# Patient Record
Sex: FEMALE | Race: OTHER | Employment: UNEMPLOYED | URBAN - METROPOLITAN AREA
[De-identification: names, ages, dates, MRNs, and addresses within clinical notes are randomized per-mention and may not be internally consistent; named-entity substitution may affect disease eponyms.]

---

## 2022-02-21 ENCOUNTER — OFFICE VISIT (OUTPATIENT)
Dept: FAMILY MEDICINE CLINIC | Age: 22
End: 2022-02-21

## 2022-02-21 ENCOUNTER — HOSPITAL ENCOUNTER (OUTPATIENT)
Dept: LAB | Age: 22
Discharge: HOME | End: 2022-02-21

## 2022-02-21 VITALS
RESPIRATION RATE: 16 BRPM | WEIGHT: 99.4 LBS | HEIGHT: 61 IN | DIASTOLIC BLOOD PRESSURE: 65 MMHG | HEART RATE: 84 BPM | TEMPERATURE: 98.1 F | SYSTOLIC BLOOD PRESSURE: 103 MMHG | BODY MASS INDEX: 18.77 KG/M2 | OXYGEN SATURATION: 100 %

## 2022-02-21 DIAGNOSIS — R20.0 PAIN AND NUMBNESS OF LEFT UPPER EXTREMITY: ICD-10-CM

## 2022-02-21 DIAGNOSIS — R42 DIZZINESS: ICD-10-CM

## 2022-02-21 DIAGNOSIS — R00.2 PALPITATIONS: ICD-10-CM

## 2022-02-21 DIAGNOSIS — M79.602 PAIN AND NUMBNESS OF LEFT UPPER EXTREMITY: ICD-10-CM

## 2022-02-21 DIAGNOSIS — Z71.89 COUNSELING AND COORDINATION OF CARE: Primary | ICD-10-CM

## 2022-02-21 DIAGNOSIS — R55 SYNCOPE, UNSPECIFIED SYNCOPE TYPE: Primary | ICD-10-CM

## 2022-02-21 LAB — HGB BLD-MCNC: 14.2 G/DL

## 2022-02-21 PROCEDURE — 99203 OFFICE O/P NEW LOW 30 MIN: CPT | Performed by: FAMILY MEDICINE

## 2022-02-21 PROCEDURE — 85025 COMPLETE CBC W/AUTO DIFF WBC: CPT

## 2022-02-21 PROCEDURE — 84443 ASSAY THYROID STIM HORMONE: CPT

## 2022-02-21 PROCEDURE — 99080 SPECIAL REPORTS OR FORMS: CPT | Performed by: FAMILY MEDICINE

## 2022-02-21 PROCEDURE — 82306 VITAMIN D 25 HYDROXY: CPT

## 2022-02-21 PROCEDURE — 93000 ELECTROCARDIOGRAM COMPLETE: CPT | Performed by: FAMILY MEDICINE

## 2022-02-21 PROCEDURE — 85018 HEMOGLOBIN: CPT | Performed by: FAMILY MEDICINE

## 2022-02-21 PROCEDURE — 80053 COMPREHEN METABOLIC PANEL: CPT

## 2022-02-21 RX ORDER — NAPROXEN 500 MG/1
500 TABLET ORAL 2 TIMES DAILY
COMMUNITY
Start: 2022-02-16 | End: 2022-07-13

## 2022-02-21 NOTE — PROGRESS NOTES
An After Visit Summary was printed and given to the patient. Patient filled out a medical release form to have records sent from [address] [place] [address] patient in making an appointment for Cardiology. Patient met with Shira Seaman  for assistance with care card application.

## 2022-02-21 NOTE — PROGRESS NOTES
Sebastián Fofana is a 24 y.o. female  Chief Complaint   Patient presents with   [facility] Follow Up     [facility].02/15/22    Shoulder Pain     L shoulder pain    Irregular Heart Beat     dizziness. Blood pressure 103/65, pulse 84, temperature 98.1 °F (36.7 °C), temperature source Temporal, resp. rate 16, height 5' 1.02\" (1.55 m), weight 99 lb 6.4 oz (45.1 kg), last menstrual period 02/09/2022, SpO2 100 %. 1. Have you been to the ER, urgent care clinic since your last visit? Hospitalized since your last visit? No    2. Have you seen or consulted any other health care providers outside of the [address] [place] [address] since your last visit? Include any pap smears or colon screening.  No

## 2022-02-21 NOTE — PROGRESS NOTES
Assisted patient with FA application. Patient has been instructed what documents to attach to completed FA application and to bring with her to Cardiologist appointment. Patient has verbalized understanding. Patient has been screened for [facility].  Patient ha opted outl

## 2022-02-22 ENCOUNTER — OFFICE VISIT (OUTPATIENT)
Dept: CARDIOLOGY CLINIC | Age: 22
End: 2022-02-22

## 2022-02-22 VITALS
HEIGHT: 61 IN | RESPIRATION RATE: 16 BRPM | SYSTOLIC BLOOD PRESSURE: 98 MMHG | OXYGEN SATURATION: 99 % | DIASTOLIC BLOOD PRESSURE: 60 MMHG | HEART RATE: 77 BPM | WEIGHT: 99 LBS | BODY MASS INDEX: 18.69 KG/M2

## 2022-02-22 DIAGNOSIS — R94.31 ABNORMAL ECG: ICD-10-CM

## 2022-02-22 DIAGNOSIS — I10 PRIMARY HYPERTENSION: Primary | ICD-10-CM

## 2022-02-22 PROCEDURE — 99203 OFFICE O/P NEW LOW 30 MIN: CPT | Performed by: SPECIALIST

## 2022-02-22 NOTE — LETTER
2/22/2022 3:59 PM    Patient:  Sebastián Fofana   YOB: 2000  Date of Visit: 2/22/2022          To Whom it may concern:  Sergio Wu is under my care at [facility]. She is currently unable to work and this will be reasesed at her follow up appointment 3/11/22. Please call if you have any questions at [phone].         Sincerely,            Christie Granado MD

## 2022-02-22 NOTE — PATIENT INSTRUCTIONS
Please wear a 2 week loop monitor.    Please have an echo cardiogram  Follow up with Dr. Meng Kellogg after testing

## 2022-02-22 NOTE — PROGRESS NOTES
Visit Vitals  BP 98/60   Pulse 77   Resp 16   Ht 5' 1\" (1.549 m)   Wt 99 lb (44.9 kg)   LMP 02/09/2022 (Approximate)   SpO2 99%   BMI 18.71 kg/m²

## 2022-02-25 ENCOUNTER — ANCILLARY PROCEDURE (OUTPATIENT)
Dept: CARDIOLOGY CLINIC | Age: 22
End: 2022-02-25

## 2022-02-25 VITALS — BODY MASS INDEX: 18.69 KG/M2 | HEIGHT: 61 IN | WEIGHT: 99 LBS

## 2022-02-25 DIAGNOSIS — I10 PRIMARY HYPERTENSION: ICD-10-CM

## 2022-02-25 DIAGNOSIS — R94.31 ABNORMAL ECG: ICD-10-CM

## 2022-02-25 PROCEDURE — 93306 TTE W/DOPPLER COMPLETE: CPT | Performed by: SPECIALIST

## 2022-03-01 LAB
ECHO AO ROOT DIAM: 2.2 CM
ECHO AO ROOT INDEX: 1.57 CM/M2
ECHO AV AREA PEAK VELOCITY: 2.1 CM2
ECHO AV AREA VTI: 2.1 CM2
ECHO AV AREA/BSA PEAK VELOCITY: 1.5 CM2/M2
ECHO AV AREA/BSA VTI: 1.5 CM2/M2
ECHO AV MEAN GRADIENT: 3 MMHG
ECHO AV MEAN VELOCITY: 0.8 M/S
ECHO AV PEAK GRADIENT: 5 MMHG
ECHO AV PEAK VELOCITY: 1.1 M/S
ECHO AV VELOCITY RATIO: 0.82
ECHO AV VTI: 22.7 CM
ECHO LA DIAMETER INDEX: 1.64 CM/M2
ECHO LA DIAMETER: 2.3 CM
ECHO LA TO AORTIC ROOT RATIO: 1.05
ECHO LA VOL 4C: 19 ML (ref 22–52)
ECHO LA VOLUME INDEX A4C: 14 ML/M2 (ref 16–34)
ECHO LV E' LATERAL VELOCITY: 16 CM/S
ECHO LV E' SEPTAL VELOCITY: 13 CM/S
ECHO LV EDV A2C: 54 ML
ECHO LV EDV A4C: 57 ML
ECHO LV EDV BP: 57 ML (ref 56–104)
ECHO LV EDV INDEX A4C: 41 ML/M2
ECHO LV EDV INDEX BP: 41 ML/M2
ECHO LV EDV NDEX A2C: 39 ML/M2
ECHO LV EJECTION FRACTION A2C: 63 %
ECHO LV EJECTION FRACTION A4C: 63 %
ECHO LV EJECTION FRACTION BIPLANE: 63 % (ref 55–100)
ECHO LV ESV A2C: 20 ML
ECHO LV ESV A4C: 21 ML
ECHO LV ESV BP: 21 ML (ref 19–49)
ECHO LV ESV INDEX A2C: 14 ML/M2
ECHO LV ESV INDEX A4C: 15 ML/M2
ECHO LV ESV INDEX BP: 15 ML/M2
ECHO LV FRACTIONAL SHORTENING: 35 % (ref 28–44)
ECHO LV INTERNAL DIMENSION DIASTOLE INDEX: 2.64 CM/M2
ECHO LV INTERNAL DIMENSION DIASTOLIC: 3.7 CM (ref 3.9–5.3)
ECHO LV INTERNAL DIMENSION SYSTOLIC INDEX: 1.71 CM/M2
ECHO LV INTERNAL DIMENSION SYSTOLIC: 2.4 CM
ECHO LV IVSD: 0.8 CM (ref 0.6–0.9)
ECHO LV MASS 2D: 82.3 G (ref 67–162)
ECHO LV MASS INDEX 2D: 58.8 G/M2 (ref 43–95)
ECHO LV POSTERIOR WALL DIASTOLIC: 0.8 CM (ref 0.6–0.9)
ECHO LV RELATIVE WALL THICKNESS RATIO: 0.43
ECHO LVOT AREA: 2.5 CM2
ECHO LVOT AV VTI INDEX: 0.81
ECHO LVOT DIAM: 1.8 CM
ECHO LVOT MEAN GRADIENT: 2 MMHG
ECHO LVOT PEAK GRADIENT: 3 MMHG
ECHO LVOT PEAK VELOCITY: 0.9 M/S
ECHO LVOT STROKE VOLUME INDEX: 33.4 ML/M2
ECHO LVOT SV: 46.8 ML
ECHO LVOT VTI: 18.4 CM
ECHO MV A VELOCITY: 0.61 M/S
ECHO MV E DECELERATION TIME (DT): 121.5 MS
ECHO MV E VELOCITY: 0.86 M/S
ECHO MV E/A RATIO: 1.41
ECHO MV E/E' LATERAL: 5.38
ECHO MV E/E' RATIO (AVERAGED): 6
ECHO MV E/E' SEPTAL: 6.62
ECHO PV MAX VELOCITY: 0.9 M/S
ECHO PV PEAK GRADIENT: 3 MMHG
ECHO RV TAPSE: 1.8 CM (ref 1.5–2)
ECHO TV REGURGITANT MAX VELOCITY: 2.23 M/S
ECHO TV REGURGITANT PEAK GRADIENT: 20 MMHG

## 2022-03-11 ENCOUNTER — TELEPHONE (OUTPATIENT)
Dept: CARDIOLOGY CLINIC | Age: 22
End: 2022-03-11

## 2022-03-11 ENCOUNTER — OFFICE VISIT (OUTPATIENT)
Dept: CARDIOLOGY CLINIC | Age: 22
End: 2022-03-11

## 2022-03-11 VITALS
DIASTOLIC BLOOD PRESSURE: 60 MMHG | HEIGHT: 61 IN | WEIGHT: 101 LBS | RESPIRATION RATE: 16 BRPM | HEART RATE: 67 BPM | SYSTOLIC BLOOD PRESSURE: 88 MMHG | OXYGEN SATURATION: 99 % | BODY MASS INDEX: 19.07 KG/M2

## 2022-03-11 DIAGNOSIS — I95.1 ORTHOSTATIC HYPOTENSION: Primary | ICD-10-CM

## 2022-03-11 DIAGNOSIS — I95.1 ORTHOSTATIC HYPOTENSION: ICD-10-CM

## 2022-03-11 PROBLEM — I95.9 HYPOTENSION: Status: ACTIVE | Noted: 2022-03-11

## 2022-03-11 PROCEDURE — 99214 OFFICE O/P EST MOD 30 MIN: CPT | Performed by: SPECIALIST

## 2022-03-11 RX ORDER — FLUDROCORTISONE ACETATE 0.1 MG/1
0.1 TABLET ORAL DAILY
Qty: 90 TABLET | Refills: 0 | Status: SHIPPED | OUTPATIENT
Start: 2022-03-11 | End: 2022-07-13

## 2022-03-11 NOTE — TELEPHONE ENCOUNTER
Kymberly Giles is calling from [facility] at [facility] and she is trying to find out if the patient needs a tilt table test,TE or Cardioversion to be performed.     [phone]

## 2022-03-11 NOTE — PROGRESS NOTES
Visit Vitals  BP (!) 88/60   Pulse 67   Resp 16   Ht 5' 1\" (1.549 m)   Wt 101 lb (45.8 kg)   LMP 02/09/2022 (Approximate)   SpO2 99%   BMI 19.08 kg/m²

## 2022-03-11 NOTE — H&P (VIEW-ONLY)
[facility]                                                            OFFICE NOTE        Deanne Spatz M.D.,MIYA. Ramón Torres   2000  [id number]    Date/Time:  3/11/985506:57 AM            SUBJECTIVE:  She continues to have dizziness and palpitations. She did have palpitation during her loop monitor. Syncope not occurred yet but she has had several episode of presyncope she tells me. Assessment/Plan    1. Palpitationsdizziness:    I discussed with her the results of the 2 weeks event monitor and the echocardiogram.    The event monitor despite the occurrences of palpitations of failed to reveal any complex dysrhythmias and revealed only sinus tachycardia. The echocardiogram the other hand was essentially normal with a normal ejection fraction only mild tricuspid regurgitation. At this point it does not appear to be a complex rhythm issues with this patient but we need to consider the possibility of autonomic dysfunctionPOTS. Start Florinef 0.1 mg daily. I have asked her to increase fluid intake and salt intake. Proceed with tilt table test.        HPI   Very pleasant 24years old female with unremarkable past medical history who presents for cardiac evaluation of the palpitations. Apparently the patient was seen at [facility] for palpitations and she was told she had an arrhythmia but they were not sure what it was released she does not know what it was.     She does have dizziness discomfort in the left arm when she does have the palpitations. There is no report of syncopal episode at this point. There is no history of hypertension hyperlipidemia diabetes congenital heart disease that she knows of.     She does not drink or smoke. Currently not working or studying.               CARDIAC STUDIES        02/25/22    ECHO ADULT COMPLETE 03/01/2022 3/1/2022    Interpretation Summary    Left Ventricle: Left ventricle size is normal. Normal wall thickness. Normal wall motion. Normal left ventricular systolic function. EF by 2D Simpsons Biplane is 63%. Normal diastolic function.   Tricuspid Valve: Valve structure is normal. Trace transvalvular regurgitation. Signed by: Fadumo Lafleur MD on 3/1/2022  4:33 PM                              EKG Results     None              IMAGING      MRI Results (most recent):  No results found for this or any previous visit. CT Results (most recent):  No results found for this or any previous visit. XR Results (most recent):  No results found for this or any previous visit. No past medical history on file. No past surgical history on file. Social History     Tobacco Use    Smoking status: Never Smoker    Smokeless tobacco: Never Used   Vaping Use    Vaping Use: Never used   Substance Use Topics    Alcohol use: Never    Drug use: Never     No family history on file. No Known Allergies      Visit Vitals  BP (!) 88/60   Pulse 67   Resp 16   Ht 5' 1\" (1.549 m)   Wt 101 lb (45.8 kg)   LMP 02/09/2022 (Approximate)   SpO2 99%   BMI 19.08 kg/m²         Last 3 Recorded Weights in this Encounter    03/11/22 1044   Weight: 101 lb (45.8 kg)            Review of Systems:   Pertinent items are noted in the History of Present Illness. Visit Vitals  BP (!) 88/60   Pulse 67   Resp 16   Ht 5' 1\" (1.549 m)   Wt 101 lb (45.8 kg)   LMP 02/09/2022 (Approximate)   SpO2 99%   BMI 19.08 kg/m²     General Appearance:  Well developed, well nourished,alert and oriented x 3, and individual in no acute distress. Ears/Nose/Mouth/Throat:   Hearing grossly normal.         Neck: Supple. Chest:   Lungs clear to auscultation bilaterally. Cardiovascular:  Regular rate and rhythm, S1, S2 normal, no murmur. Abdomen:   Soft, non-tender, bowel sounds are active. Extremities: No edema bilaterally.     Skin: Warm and dry. Current Outpatient Medications on File Prior to Visit   Medication Sig Dispense Refill    naproxen (NAPROSYN) 500 mg tablet Take 500 mg by mouth two (2) times a day. No current facility-administered medications on file prior to visit. Estefany Bonilla had no medications administered during this visit. Current Outpatient Medications   Medication Sig    naproxen (NAPROSYN) 500 mg tablet Take 500 mg by mouth two (2) times a day. No current facility-administered medications for this visit. No results found for: CHOL, CHOLPOCT, CHOLX, CHLST, CHOLV, HDL, HDLPOC, HDLP, LDL, LDLCPOC, LDLC, DLDLP, VLDLC, VLDL, TGLX, TRIGL, TRIGP, TGLPOCT, CHHD, CHHDX    No results found for: NA, K, CL, CO2, AGAP, GLU, BUN, CREA, BUCR, GFRAA, GFRNA, CA, GFRAA    No results found for: ALT, GGT, GGTP, AP, APIT, APX, CBIL, TBIL, TBILI    Lab Results   Component Value Date/Time    Hemoglobin (POC) 14.2 02/21/2022 11:44 AM       No results found for: TSH, TSH2, TSH3, TSHP, TSHEXT      No results found for: CHOL, CHOLPOCT, CHOLX, CHLST, CHOLV, HDL, HDLP, LDL, LDLC, DLDLP, TGLX, TRIGL, TRIGP, TGLPOCT, NTGLT, CHHD, CHHDX             Please note that this dictation was completed with Fision, the "[employer], Mobile [employer]" voice recognition software. Quite often unanticipated grammatical, syntax, homophones, and other interpretative errors are inadvertently transcribed by the computer software. Please disregard these errors. Please excuse any errors that have escaped final proofreading.

## 2022-03-11 NOTE — PROGRESS NOTES
[address] [facility]                                                            OFFICE NOTE        Radu Florence M.D.,MIYA. Ortiz Santizo   2000  [id number]    Date/Time:  3/11/910196:57 AM            SUBJECTIVE:  She continues to have dizziness and palpitations. She did have palpitation during her loop monitor. Syncope not occurred yet but she has had several episode of presyncope she tells me. Assessment/Plan    1. Palpitations-dizziness:    I discussed with her the results of the 2 weeks event monitor and the echocardiogram.    The event monitor despite the occurrences of palpitations of failed to reveal any complex dysrhythmias and revealed only sinus tachycardia. The echocardiogram the other hand was essentially normal with a normal ejection fraction only mild tricuspid regurgitation. At this point it does not appear to be a complex rhythm issues with this patient but we need to consider the possibility of autonomic dysfunction-POTS. Start Florinef 0.1 mg daily. I have asked her to increase fluid intake and salt intake. Proceed with tilt table test.        HPI   Very pleasant 24years old female with unremarkable past medical history who presents for cardiac evaluation of the palpitations. Apparently the patient was seen at [facility] for palpitations and she was told she had an arrhythmia but they were not sure what it was released she does not know what it was.     She does have dizziness discomfort in the left arm when she does have the palpitations. There is no report of syncopal episode at this point. There is no history of hypertension hyperlipidemia diabetes congenital heart disease that she knows of.     She does not drink or smoke. Currently not working or studying.               CARDIAC STUDIES        02/25/22    ECHO ADULT COMPLETE 03/01/2022 3/1/2022    Interpretation Summary    Left Ventricle: Left ventricle size is normal. Normal wall thickness. Normal wall motion. Normal left ventricular systolic function. EF by 2D Simpsons Biplane is 63%. Normal diastolic function.   Tricuspid Valve: Valve structure is normal. Trace transvalvular regurgitation. Signed by: Jagdish Conner MD on 3/1/2022  4:33 PM                              EKG Results     None              IMAGING      MRI Results (most recent):  No results found for this or any previous visit. CT Results (most recent):  No results found for this or any previous visit. XR Results (most recent):  No results found for this or any previous visit. No past medical history on file. No past surgical history on file. Social History     Tobacco Use    Smoking status: Never Smoker    Smokeless tobacco: Never Used   Vaping Use    Vaping Use: Never used   Substance Use Topics    Alcohol use: Never    Drug use: Never     No family history on file. No Known Allergies      Visit Vitals  BP (!) 88/60   Pulse 67   Resp 16   Ht 5' 1\" (1.549 m)   Wt 101 lb (45.8 kg)   LMP 02/09/2022 (Approximate)   SpO2 99%   BMI 19.08 kg/m²         Last 3 Recorded Weights in this Encounter    03/11/22 1044   Weight: 101 lb (45.8 kg)            Review of Systems:   Pertinent items are noted in the History of Present Illness. Visit Vitals  BP (!) 88/60   Pulse 67   Resp 16   Ht 5' 1\" (1.549 m)   Wt 101 lb (45.8 kg)   LMP 02/09/2022 (Approximate)   SpO2 99%   BMI 19.08 kg/m²     General Appearance:  Well developed, well nourished,alert and oriented x 3, and individual in no acute distress. Ears/Nose/Mouth/Throat:   Hearing grossly normal.         Neck: Supple. Chest:   Lungs clear to auscultation bilaterally. Cardiovascular:  Regular rate and rhythm, S1, S2 normal, no murmur. Abdomen:   Soft, non-tender, bowel sounds are active. Extremities: No edema bilaterally.     Skin: Warm and dry. Current Outpatient Medications on File Prior to Visit   Medication Sig Dispense Refill    naproxen (NAPROSYN) 500 mg tablet Take 500 mg by mouth two (2) times a day. No current facility-administered medications on file prior to visit. Estefany Busch had no medications administered during this visit. Current Outpatient Medications   Medication Sig    naproxen (NAPROSYN) 500 mg tablet Take 500 mg by mouth two (2) times a day. No current facility-administered medications for this visit. No results found for: CHOL, CHOLPOCT, CHOLX, CHLST, CHOLV, HDL, HDLPOC, HDLP, LDL, LDLCPOC, LDLC, DLDLP, VLDLC, VLDL, TGLX, TRIGL, TRIGP, TGLPOCT, CHHD, CHHDX    No results found for: NA, K, CL, CO2, AGAP, GLU, BUN, CREA, BUCR, GFRAA, GFRNA, CA, GFRAA    No results found for: ALT, GGT, GGTP, AP, APIT, APX, CBIL, TBIL, TBILI    Lab Results   Component Value Date/Time    Hemoglobin (POC) 14.2 02/21/2022 11:44 AM       No results found for: TSH, TSH2, TSH3, TSHP, TSHEXT      No results found for: CHOL, CHOLPOCT, CHOLX, CHLST, CHOLV, HDL, HDLP, LDL, LDLC, DLDLP, TGLX, TRIGL, TRIGP, TGLPOCT, NTGLT, CHHD, CHHDX             Please note that this dictation was completed with Ecowell, the K[employer] voice recognition software. Quite often unanticipated grammatical, syntax, homophones, and other interpretative errors are inadvertently transcribed by the computer software. Please disregard these errors. Please excuse any errors that have escaped final proofreading.

## 2022-03-11 NOTE — PATIENT INSTRUCTIONS
Please START Flornief 0.1mg daily    Your Tilt Table Test procedure has been scheduled for 3/28/22 at 12:30 pm, at [facility].    Please report to Admitting Department by 11:00 am, or 2 hours prior to your scheduled procedure. Please bring a list of your current medications and medication bottles, if able, to the hospital on this day. You will need labs drawn prior to your procedure. Please go to have this done today. You should not stop your medication prior to your scheduled procedure. COVID testing 3-4 day prior to procedure. [facility]:  [facility] location: Patient discharge area at the corner of [address] [place] [address] 200: [id number] [facility] [address]  Hours: Monday-Friday 7 am to 3 pm       Prueba de la madrid inclinada: Sobre esta prueba  Tilt Table Test: About This Test  ¿Qué es? La prueba de la madrid inclinada se [place] para revisar a personas que se dimas desmayado o que se sienten aturdidas con frecuencia. Los [employer] a gregorio médico a conocer la causa de gregorio desmayo o de gregorio sensación de aturdimiento. La prueba [place] zoey madrid especial que lo inclina lentamente a zoey posición erguida. Revisa cómo responde gregorio cuerpo cuando usted cambia de posición. ¿Por qué se hace esta prueba? Esta prueba se hace para averiguar por qué usted podría tener ciertos síntomas, ailyn desmayarse o sentirse aturdido. Gregorio médico puede ca si roc síntomas están causados por problemas con gregorio frecuencia cardíaca o presión arterial.  ¿Cómo puede prepararse para la prueba? · Arnold vez se le pida que no coma ni kenyon por algunas horas antes de la prueba. · Arnold vez necesite que alguien lo conduzca a gregorio casa después de la prueba. · Informe a gregorio médico sobre [employer], vitaminas, suplementos y edgardo herbarios que florencia. Algunos pueden aumentar el riesgo de problemas priscilla gregorio prueba. Gregorio médico le dirá si debería dejar de lily cualquiera de ellos antes de la prueba y cuándo debe hacerlo.   ¿Cómo se hace la prueba? · La prueba suele hacerse en un hospital o en el consultorio de un cardiólogo. · Le pondrán pequeños parches o discos que se adhieren a la piel. Estos son sensores que registran lo que hace el corazón. También tendrá Christiana Colton manga para lily la presión arterial en el brazo. Y tony vez tenga zoey vía intravenosa. · Priscilla la prueba, usted estará acostado en zoey madrid que lo inclinará a zoey posición hussein vertical (de pie). Usted estará amarrado firmemente a la madrid. · Le revisarán la frecuencia cardíaca y la presión arterial regularmente a medida que la madrid se inclina. · Le preguntarán si siente algún síntoma ailyn náuseas, sudoración, mareo o un latido cardíaco anormal. Si no tiene ningún síntoma, es posible que le den medicamentos para acelerarle la frecuencia cardíaca. Entonces lo volverán a revisar para ca si tiene síntomas. · Si se desmaya priscilla la prueba, se volverá a poner la madrid en posición horizontal. Lo revisarán detenidamente y el equipo médico lo atenderá de inmediato. [address] personas se despierta inmediatamente. · Le revisarán la frecuencia cardíaca y la presión arterial antes de que regrese a gregorio casa. ¿Qué significan los resultados de la prueba? El resultado de la prueba es normal si gregorio presión arterial se mantiene estable priscilla la prueba y usted no siente aturdimiento ni se desmaya. El resultado de la prueba no es normal si  gregorio presión arterial y se siente aturdido o se desmaya. Estos síntomas podrían ocurrir debido a zoey frecuencia cardíaca lenta. ¿Cuánto tiempo dura la prueba? La prueba dura alrededor de Froilan Norris. Puede durar más tiempo si le dan un medicamento para acelerarle el corazón priscilla la prueba. ¿Qué ocurre después de la prueba? Probablemente pueda volver a roc actividades habituales inmediatamente. [employer] se sienten un poco cansadas o con náuseas. ¿Dónde puede encontrar más información en inglés?   Ray Porter a http://www.gray.com/  Escriba Q013 en la búsqueda para aprender más acerca de \"Prueba de la madrid inclinada: Sobre esta prueba. \"  Revisado: 10 enero, 2022               Versión del contenido: 13.2  © 2006-2022 Healthwise, [employer]. Las instrucciones de cuidado fueron adaptadas bajo licencia por [employer] [facility] [employer] (which disclaims liability or warranty for this information). Si usted tiene [place] afección médica o sobre estas instrucciones, siempre pregunte a gregorio profesional de lyndsey. [employer], [employer] niega toda garantía o responsabilidad por gregorio uso de esta información.

## 2022-03-18 PROBLEM — I95.9 HYPOTENSION: Status: ACTIVE | Noted: 2022-03-11

## 2022-03-22 ENCOUNTER — TELEPHONE (OUTPATIENT)
Dept: FAMILY MEDICINE CLINIC | Age: 22
End: 2022-03-22

## 2022-03-22 NOTE — TELEPHONE ENCOUNTER
Tc from the pt returning a call she got from the front office. The pt asked about an appt tomorrow that she had been called about she did not know what it was for or where it is located. The pt was given the address and phone # it was also texted to her and she was told it was for a lab only appt. The pt was also asked about the over due chest xray. If she was still going to get it or could we cancel the order. The pt did stated she did not know about the order. Explained procedure for obtaining xray as a walk-in and told pt to go to Outpatient Registration. Provided address and directions to facility via texted the picture of the imaging centers with the addresses. Told pt that someone will call them after we get results. She verbalized understanding.  Vu Zuniga RN

## 2022-03-22 NOTE — TELEPHONE ENCOUNTER
Tc to the pt with Oscar Almanzar  2x. No answer. A message was left for her to call the cbn. The pt was being called to ask about the over due chest xray. We needed to know if she would still need this or can we cancel it.  Cassie Lara RN

## 2022-03-23 ENCOUNTER — LAB ONLY (OUTPATIENT)
Dept: FAMILY MEDICINE CLINIC | Age: 22
End: 2022-03-23

## 2022-03-23 ENCOUNTER — HOSPITAL ENCOUNTER (OUTPATIENT)
Dept: GENERAL RADIOLOGY | Age: 22
Discharge: HOME | End: 2022-03-23
Attending: FAMILY MEDICINE
Payer: MEDICAID

## 2022-03-23 ENCOUNTER — HOSPITAL ENCOUNTER (OUTPATIENT)
Dept: LAB | Age: 22
Discharge: HOME | End: 2022-03-23

## 2022-03-23 DIAGNOSIS — R55 SYNCOPE, UNSPECIFIED SYNCOPE TYPE: ICD-10-CM

## 2022-03-23 DIAGNOSIS — R42 DIZZINESS: ICD-10-CM

## 2022-03-23 DIAGNOSIS — R55 SYNCOPE, UNSPECIFIED SYNCOPE TYPE: Primary | ICD-10-CM

## 2022-03-23 PROCEDURE — 71046 X-RAY EXAM CHEST 2 VIEWS: CPT

## 2022-03-23 RX ORDER — SODIUM CHLORIDE 0.9 % (FLUSH) 0.9 %
5-40 SYRINGE (ML) INJECTION AS NEEDED
Status: CANCELLED | OUTPATIENT
Start: 2022-03-23

## 2022-03-23 RX ORDER — SODIUM CHLORIDE 0.9 % (FLUSH) 0.9 %
5-40 SYRINGE (ML) INJECTION EVERY 8 HOURS
Status: CANCELLED | OUTPATIENT
Start: 2022-03-23

## 2022-03-24 ENCOUNTER — HOSPITAL ENCOUNTER (OUTPATIENT)
Dept: PREADMISSION TESTING | Age: 22
Discharge: HOME | End: 2022-03-24
Attending: INTERNAL MEDICINE
Payer: MEDICAID

## 2022-03-24 ENCOUNTER — TELEPHONE (OUTPATIENT)
Dept: CARDIOLOGY CLINIC | Age: 22
End: 2022-03-24

## 2022-03-24 ENCOUNTER — TRANSCRIBE ORDER (OUTPATIENT)
Dept: REGISTRATION | Age: 22
End: 2022-03-24

## 2022-03-24 DIAGNOSIS — U07.1 COVID-19: Primary | ICD-10-CM

## 2022-03-24 DIAGNOSIS — U07.1 COVID-19: ICD-10-CM

## 2022-03-24 PROCEDURE — 80053 COMPREHEN METABOLIC PANEL: CPT

## 2022-03-24 PROCEDURE — 84443 ASSAY THYROID STIM HORMONE: CPT

## 2022-03-24 PROCEDURE — U0005 INFEC AGEN DETEC AMPLI PROBE: HCPCS

## 2022-03-24 PROCEDURE — 82306 VITAMIN D 25 HYDROXY: CPT

## 2022-03-24 PROCEDURE — 85025 COMPLETE CBC W/AUTO DIFF WBC: CPT

## 2022-03-24 NOTE — TELEPHONE ENCOUNTER
----- Message from Manny Brunner RN sent at 3/14/2022  8:55 AM EDT -----  Regarding: FW: Tilt Table Test    ----- Message -----  From: Chelsie Chaparro MD  Sent: 3/13/2022   4:10 PM EDT  To: Manny Brunner RN, Tiffani Palmer RN, #  Subject: RE: Tilt Table Test                              We need to hold it 2 days before. Tilt test is not recommended to evaluate effectiveness of medication  ----- Message -----  From: Ken Medrnao RN  Sent: 3/11/2022  11:30 AM EDT  To: Toshia Austin MD  Subject: Tilt Table Test                                  Hey this is a patient of Dr. Gio Elizabeth that I am setting up for a Tilt. [facility] and he wanted me to verify with you that she does not need to hold prior to her test.     I know we normally do not hold but I told him I would verify with you    Thanks!

## 2022-03-24 NOTE — TELEPHONE ENCOUNTER
TC to pt, ID verified.  ID # [id number]. Advised pt to hold florinef 2 days prior to procedure. Pt understands. Pt requested I speak with her mother. Her mother states the pt got a letter saying she would owe over 1k dollars for her procedure. She was under the impression she had filled for something that would make it free. States she is not sure who called her yesterday or what number. Advised her to call the billing department. Reiterated instructions and confirmed Tilt table test date time and location. No further questions.

## 2022-03-28 ENCOUNTER — HOSPITAL ENCOUNTER (OUTPATIENT)
Dept: NON INVASIVE DIAGNOSTICS | Age: 22
Discharge: HOME | End: 2022-03-28
Attending: INTERNAL MEDICINE
Payer: MEDICAID

## 2022-03-28 VITALS
DIASTOLIC BLOOD PRESSURE: 54 MMHG | RESPIRATION RATE: 21 BRPM | SYSTOLIC BLOOD PRESSURE: 105 MMHG | OXYGEN SATURATION: 97 % | HEART RATE: 86 BPM

## 2022-03-28 DIAGNOSIS — Z00.8 TILT TABLE EVALUATION: ICD-10-CM

## 2022-03-28 DIAGNOSIS — I95.9 ACUTE HYPOTENSION: ICD-10-CM

## 2022-03-28 PROCEDURE — 74011250637 HC RX REV CODE- 250/637: Performed by: INTERNAL MEDICINE

## 2022-03-28 PROCEDURE — 93660 TILT TABLE EVALUATION: CPT | Performed by: INTERNAL MEDICINE

## 2022-03-28 PROCEDURE — 93660 TILT TABLE EVALUATION: CPT

## 2022-03-28 RX ORDER — NITROGLYCERIN 0.4 MG/1
0.4 TABLET SUBLINGUAL
Status: DISCONTINUED | OUTPATIENT
Start: 2022-03-28 | End: 2022-04-01 | Stop reason: HOSPADM

## 2022-03-28 RX ORDER — SODIUM CHLORIDE 0.9 % (FLUSH) 0.9 %
5-40 SYRINGE (ML) INJECTION EVERY 8 HOURS
Status: CANCELLED | OUTPATIENT
Start: 2022-03-28

## 2022-03-28 RX ORDER — SODIUM CHLORIDE 0.9 % (FLUSH) 0.9 %
5-40 SYRINGE (ML) INJECTION AS NEEDED
Status: CANCELLED | OUTPATIENT
Start: 2022-03-28

## 2022-03-28 RX ORDER — HYDROCODONE BITARTRATE AND ACETAMINOPHEN 5; 325 MG/1; MG/1
1 TABLET ORAL
Status: CANCELLED | OUTPATIENT
Start: 2022-03-28

## 2022-03-28 RX ORDER — ACETAMINOPHEN 325 MG/1
650 TABLET ORAL
Status: CANCELLED | OUTPATIENT
Start: 2022-03-28

## 2022-03-28 RX ORDER — ONDANSETRON 2 MG/ML
4 INJECTION INTRAMUSCULAR; INTRAVENOUS
Status: CANCELLED | OUTPATIENT
Start: 2022-03-28

## 2022-03-28 RX ADMIN — NITROGLYCERIN 0.4 MG: 0.4 TABLET, ORALLY DISINTEGRATING SUBLINGUAL at 13:11

## 2022-03-28 NOTE — PROGRESS NOTES
Post tilt table d/c instructions reviewed with pt. And mother using the  services. Pt. Instructed to resume Florinef, eat high salt foods, and increase fluid intake especially in the hot weather. Informed that in the event of pregnancy, she should call Dr. Melba Carvalho office and will need to d/c the Florinef. Verbalized understanding but written instructions given in Bulgarian.

## 2022-03-28 NOTE — PROCEDURES
Cardiac Procedure Note   Patient: Karen Chang  MRN: [id number]  SSN: [id number]   YOB: 2000 Age: 24 y.o.   Sex: female    Date of Procedure: 3/28/2022   Pre-procedure Diagnosis: dizziness, sinus tachycardia  Post-procedure Diagnosis: orthostatic hypotension with reflex tachycardia  Procedure: Tilt Table Study  :  Dr. Nico Alexander MD    Assistant(s):  None  Anesthesia: none  Estimated Blood Loss: none  Specimens Removed: None  Findings: 1 sublingual nitro  Orthostatic hypotension   Mild dizziness  Complications: None   Implants:  None  Signed by:  Nico Alexander MD  3/28/2022  3:33 PM
Detail Level: Detailed
Quality 111:Pneumonia Vaccination Status For Older Adults: Pneumococcal Vaccination not Administered or Previously Received, Reason not Otherwise Specified
Quality 431: Preventive Care And Screening: Unhealthy Alcohol Use - Screening: Patient screened for unhealthy alcohol use using a single question and scores less than 2 times per year
Quality 110: Preventive Care And Screening: Influenza Immunization: Influenza Immunization Administered during Influenza season
Quality 226: Preventive Care And Screening: Tobacco Use: Screening And Cessation Intervention: Patient screened for tobacco use and is an ex/non-smoker

## 2022-03-28 NOTE — DISCHARGE INSTRUCTIONS
Discharge Instructions Post Tilt Table Test    1. No driving restrictions related to today's procedure. No sedation medication was given. 2.  Follow up as noted below. Future Appointments   Date Time Provider Darryl Estrada   3/28/2022 12:30 PM STRESS ECHO LAB 1 [facility] [address]   4/13/2022 11:00 AM MD Samanta Cuello M.D.   Electrophysiology/Cardiology  [facility] [place]  [address] [place] [address]                               [phone]

## 2022-03-28 NOTE — INTERVAL H&P NOTE
Update History & Physical    The Patient's History and Physical of March 11, 2022 was reviewed with the patient and I examined the patient. There was no change. The surgical site was confirmed by the patient and me. Plan:  The risk, benefits, expected outcome, and alternative to the recommended procedure have been discussed with the patient. Patient understands and wants to proceed with the procedure.     Electronically signed by Ivelisse White MD on 3/28/2022 at 3:36 PM

## 2022-03-30 ENCOUNTER — TELEPHONE (OUTPATIENT)
Dept: CARDIOLOGY CLINIC | Age: 22
End: 2022-03-30

## 2022-03-30 NOTE — TELEPHONE ENCOUNTER
Per VO from Dr. Acosta Steinberg pt okay to return to work as long as she is feeling better with medication and she will need to keep her follow up. Spoke with pt and family member. Pt states she has been doing well since starting Florinef and has not had any dizzy episodes since. Explained to pt she will need to continue this medication and to keep her follow up. Letter given to pt to return to work.

## 2022-03-30 NOTE — LETTER
NOTIFICATION OF RETURN TO WORK / SCHOOL    3/30/2022 1:41 PM    Ms. Kenzie Hernández  Libby Rodriguez - [facility] [address] [place] [id number]  . To Whom It May Concern:    Kenzie Hernández is under the care of [address] . She will be able to return to work/school on 3/31/22. If there are questions or concerns please have the patient contact our office.     Sincerely,            Romain Molina MD

## 2022-03-30 NOTE — TELEPHONE ENCOUNTER
Pt walked into office requesting letter stating she can go back to work. Advised I would forward her message to Dr. Teresa Kruse and get back with her.

## 2022-04-04 ENCOUNTER — TELEPHONE (OUTPATIENT)
Dept: FAMILY MEDICINE CLINIC | Age: 22
End: 2022-04-04

## 2022-04-04 NOTE — TELEPHONE ENCOUNTER
Tc to [facility] spoke with Denice. She stated they have all the pt's labs that were done on 03/23/22, and 03/24/22. They were drawn at [facility] lab. Denice will fax the labs over to [phone] now.  Jyotsna May RN

## 2022-04-04 NOTE — TELEPHONE ENCOUNTER
Returned my call from earlier and left a message that her chest xray was normal. She asked for her lab work results. There was none in the chart for her.  Lester Colindres RN

## 2022-04-04 NOTE — PROGRESS NOTES
Tc to the pt with the message from the provider regarding her chest xray. Anthony Gong was the . The pt was called 2x. A message was left with the results of the chest xray (it was normal) on her identifiable VM.  Malon Alpers, RN

## 2022-04-07 ENCOUNTER — TELEPHONE (OUTPATIENT)
Dept: FAMILY MEDICINE CLINIC | Age: 22
End: 2022-04-07

## 2022-04-07 NOTE — TELEPHONE ENCOUNTER
The copy of the pt's missing lab results from 03/23/22,, sent to the [facility] main office. They are not in the pt's chart in [facility]. The copy was made to give to Dr Lynne Cee and a copy was put in the scanning basket for Bonney Severe to scan into the pt's chart. A message was routed to the provider regarding these lab results. The pt is waiting for a call with her lab results.  Mishel Gay RN

## 2022-04-08 LAB
ALBUMIN SERPL-MCNC: 4 G/DL (ref 3.5–5)
ALBUMIN/GLOB SERPL: 1.2 {RATIO} (ref 1.1–2.2)
ALP SERPL-CCNC: 99 U/L (ref 45–117)
ALT SERPL-CCNC: 22 U/L (ref 12–78)
ANION GAP SERPL CALC-SCNC: 6 MMOL/L (ref 5–15)
AST SERPL-CCNC: 10 U/L (ref 15–37)
BASOPHILS # BLD: 0 K/UL (ref 0–0.1)
BASOPHILS NFR BLD: 1 % (ref 0–1)
BILIRUB SERPL-MCNC: 0.4 MG/DL (ref 0.2–1)
BUN SERPL-MCNC: 13 MG/DL (ref 6–20)
BUN/CREAT SERPL: 21 (ref 12–20)
CALCIUM SERPL-MCNC: 9.2 MG/DL (ref 8.5–10.1)
CHLORIDE SERPL-SCNC: 105 MMOL/L (ref 97–108)
CO2 SERPL-SCNC: 27 MMOL/L (ref 21–32)
COMMENT, HOLDF: NORMAL
CREAT SERPL-MCNC: 0.61 MG/DL (ref 0.55–1.02)
DIFFERENTIAL METHOD BLD: ABNORMAL
EOSINOPHIL # BLD: 0.1 K/UL (ref 0–0.4)
EOSINOPHIL NFR BLD: 2 % (ref 0–7)
ERYTHROCYTE [DISTWIDTH] IN BLOOD BY AUTOMATED COUNT: 13.2 % (ref 11.5–14.5)
GLOBULIN SER CALC-MCNC: 3.4 G/DL (ref 2–4)
GLUCOSE SERPL-MCNC: 93 MG/DL (ref 65–100)
HCT VFR BLD AUTO: 39.5 % (ref 35–47)
HGB BLD-MCNC: 12.9 G/DL (ref 11.5–16)
IMM GRANULOCYTES # BLD AUTO: 0 K/UL (ref 0–0.04)
IMM GRANULOCYTES NFR BLD AUTO: 1 % (ref 0–0.5)
LYMPHOCYTES # BLD: 2.2 K/UL (ref 0.8–3.5)
LYMPHOCYTES NFR BLD: 35 % (ref 12–49)
MCH RBC QN AUTO: 29.1 PG (ref 26–34)
MCHC RBC AUTO-ENTMCNC: 32.7 G/DL (ref 30–36.5)
MCV RBC AUTO: 89.2 FL (ref 80–99)
MONOCYTES # BLD: 0.5 K/UL (ref 0–1)
MONOCYTES NFR BLD: 8 % (ref 5–13)
NEUTS SEG # BLD: 3.4 K/UL (ref 1.8–8)
NEUTS SEG NFR BLD: 53 % (ref 32–75)
NRBC # BLD: 0 K/UL (ref 0–0.01)
NRBC BLD-RTO: 0 PER 100 WBC
PLATELET # BLD AUTO: 267 K/UL (ref 150–400)
PMV BLD AUTO: 10.3 FL (ref 8.9–12.9)
POTASSIUM SERPL-SCNC: 4.1 MMOL/L (ref 3.5–5.1)
PROT SERPL-MCNC: 7.4 G/DL (ref 6.4–8.2)
RBC # BLD AUTO: 4.43 M/UL (ref 3.8–5.2)
SAMPLES BEING HELD,HOLD: NORMAL
SARS-COV-2, XPLCVT: NOT DETECTED
SODIUM SERPL-SCNC: 138 MMOL/L (ref 136–145)
SOURCE, COVRS: NORMAL
WBC # BLD AUTO: 6.3 K/UL (ref 3.6–11)

## 2022-04-09 NOTE — PROGRESS NOTES
My apologies for the delay in her results. She has a vitamin D deficiency. I do recommend taking some OTC Vitamin D3 supplements 1000IU daily. Otherwise, her labs are normal -- she is not anemic or diabetic. Her liver, kidney and thyroid are functioning normally and her electrolytes are normal  Thanks.

## 2022-04-11 NOTE — PROGRESS NOTES
Tc to the pt 2x. No answer. The phone goes straight into an automated message stating the person you are calling is unavailable. No opportunity to leave a message. Tc to the pt's emergency contact (mother). The pt's mother Sol Vallecillo is on the pt's PHI. No one answered. A message was left for her to contact the [facility]. A lab letter was created and sent to the queue.  Danny Baez RN

## 2022-04-13 ENCOUNTER — OFFICE VISIT (OUTPATIENT)
Dept: CARDIOLOGY CLINIC | Age: 22
End: 2022-04-13
Payer: MEDICAID

## 2022-04-13 VITALS
WEIGHT: 103 LBS | BODY MASS INDEX: 19.45 KG/M2 | HEIGHT: 61 IN | SYSTOLIC BLOOD PRESSURE: 94 MMHG | DIASTOLIC BLOOD PRESSURE: 70 MMHG | RESPIRATION RATE: 16 BRPM | OXYGEN SATURATION: 99 % | HEART RATE: 91 BPM

## 2022-04-13 DIAGNOSIS — I95.1 ORTHOSTATIC HYPOTENSION: ICD-10-CM

## 2022-04-13 DIAGNOSIS — I95.9 ACUTE HYPOTENSION: Primary | ICD-10-CM

## 2022-04-13 PROCEDURE — 99214 OFFICE O/P EST MOD 30 MIN: CPT | Performed by: SPECIALIST

## 2022-04-13 NOTE — PROGRESS NOTES
Visit Vitals  BP 94/70   Pulse 91   Resp 16   Ht 5' 1\" (1.549 m)   Wt 103 lb (46.7 kg)   SpO2 99%   BMI 19.46 kg/m²

## 2022-04-13 NOTE — PROGRESS NOTES
[facility]                                                            OFFICE NOTE        Eduardo Yen M.D.,TRINO Leary ALBIN [place]   2000  [id number]    Date/Time:  4/13/202211:34 AM            SUBJECTIVE:  She feels much better she has had no recurrent syncope since starting Florinef. Issues reported. Assessment/Plan    1. Orthostatic hypotension: Patient status post tilt table test in March 2022 revealing orthostatic hypotension with reflex tachycardia. Started on Florinef. No recurrent syncopal episode at this time. Blood pressure remains on the low side of normal.    To be noted she also has undergone a event monitor which was unrevealing in terms of any kind of dysrhythmias and only sinus tachycardia was noted. I encouraged to increase the fluid intake and salt intake. To be noted that the transthoracic echocardiogram was essentially normal in March 2022. No additional cardiac interventions for now. I will see her back in 6 months or sooner if any question problems arise prior to that                   HPI   Very pleasant 24years old female with unremarkable past medical history who presents for cardiac evaluation of the palpitations. Apparently the patient was seen at [facility] for palpitations and she was told she had an arrhythmia but they were not sure what it was released she does not know what it was.     She does have dizziness discomfort in the left arm when she does have the palpitations. There is no report of syncopal episode at this point. There is no history of hypertension hyperlipidemia diabetes congenital heart disease that she knows of.     She does not drink or smoke. Currently not working or studying. To table test on March 2000 showed orthostatic hypotension with a reflex tachycardia.               CARDIAC STUDIES        02/25/22    ECHO ADULT COMPLETE 03/01/2022 3/1/2022    Interpretation Summary    Left Ventricle: Left ventricle size is normal. Normal wall thickness. Normal wall motion. Normal left ventricular systolic function. EF by 2D Simpsons Biplane is 63%. Normal diastolic function.   Tricuspid Valve: Valve structure is normal. Trace transvalvular regurgitation. Signed by: Jane Phan MD on 3/1/2022  4:33 PM                              EKG Results     None              IMAGING      MRI Results (most recent):  No results found for this or any previous visit. CT Results (most recent):  No results found for this or any previous visit. XR Results (most recent):  Results from Hospital Encounter encounter on 03/23/22    XR CHEST PA LAT    Narrative  Indication: Chest pain, dizziness. Exam: PA and lateral views of the chest.    There is no prior study for direct comparison. Findings: Cardiomediastinal silhouette is within normal limits. Lungs are clear  bilaterally. Pleural spaces are normal. Osseous structures are intact. Impression  No acute cardiopulmonary disease. No past medical history on file. No past surgical history on file. Social History     Tobacco Use    Smoking status: Never Smoker    Smokeless tobacco: Never Used   Vaping Use    Vaping Use: Never used   Substance Use Topics    Alcohol use: Never    Drug use: Never     No family history on file. No Known Allergies      Visit Vitals  BP 94/70   Pulse 91   Resp 16   Ht 5' 1\" (1.549 m)   Wt 103 lb (46.7 kg)   SpO2 99%   BMI 19.46 kg/m²         Last 3 Recorded Weights in this Encounter    04/13/22 1111   Weight: 103 lb (46.7 kg)            Review of Systems:   Pertinent items are noted in the History of Present Illness.        Visit Vitals  BP 94/70   Pulse 91   Resp 16   Ht 5' 1\" (1.549 m)   Wt 103 lb (46.7 kg)   SpO2 99%   BMI 19.46 kg/m²     General Appearance:  Well developed, well nourished,alert and oriented x 3, and individual in no acute distress. Ears/Nose/Mouth/Throat:   Hearing grossly normal.         Neck: Supple. Chest:   Lungs clear to auscultation bilaterally. Cardiovascular:  Regular rate and rhythm, S1, S2 normal, no murmur. Abdomen:   Soft, non-tender, bowel sounds are active. Extremities: No edema bilaterally. Skin: Warm and dry. Current Outpatient Medications on File Prior to Visit   Medication Sig Dispense Refill    fludrocortisone (FLORINEF) 0.1 mg tablet Take 1 Tablet by mouth daily. 90 Tablet 0    naproxen (NAPROSYN) 500 mg tablet Take 500 mg by mouth two (2) times a day. No current facility-administered medications on file prior to visit. Estefany Byrens had no medications administered during this visit. Current Outpatient Medications   Medication Sig    fludrocortisone (FLORINEF) 0.1 mg tablet Take 1 Tablet by mouth daily.  naproxen (NAPROSYN) 500 mg tablet Take 500 mg by mouth two (2) times a day. No current facility-administered medications for this visit. No results found for: CHOL, CHOLPOCT, CHOLX, CHLST, CHOLV, HDL, HDLPOC, HDLP, LDL, LDLCPOC, LDLC, DLDLP, VLDLC, VLDL, TGLX, TRIGL, TRIGP, TGLPOCT, CHHD, CHHDX    Lab Results   Component Value Date/Time    Sodium 138 03/24/2022 10:20 AM    Potassium 4.1 03/24/2022 10:20 AM    Chloride 105 03/24/2022 10:20 AM    CO2 27 03/24/2022 10:20 AM    Anion gap 6 03/24/2022 10:20 AM    Glucose 93 03/24/2022 10:20 AM    BUN 13 03/24/2022 10:20 AM    Creatinine 0.61 03/24/2022 10:20 AM    BUN/Creatinine ratio 21 (H) 03/24/2022 10:20 AM    GFR est AA >60 03/24/2022 10:20 AM    GFR est non-AA >60 03/24/2022 10:20 AM    Calcium 9.2 03/24/2022 10:20 AM       Lab Results   Component Value Date/Time    ALT (SGPT) 22 03/24/2022 10:20 AM    Alk.  phosphatase 99 03/24/2022 10:20 AM    Bilirubin, total 0.4 03/24/2022 10:20 AM       Lab Results   Component Value Date/Time    WBC 6.3 03/24/2022 10:20 AM    Hemoglobin (POC) 14.2 02/21/2022 11:44 AM    HGB 12.9 03/24/2022 10:20 AM    HCT 39.5 03/24/2022 10:20 AM    PLATELET 959 80/82/2174 10:20 AM    MCV 89.2 03/24/2022 10:20 AM       No results found for: TSH, TSH2, TSH3, TSHP, TSHEXT      No results found for: CHOL, CHOLPOCT, CHOLX, CHLST, CHOLV, HDL, HDLP, LDL, LDLC, DLDLP, TGLX, TRIGL, TRIGP, TGLPOCT, NTGLT, CHHD, CHHDX             Please note that this dictation was completed with [employer], the [employer] voice recognition software. Quite often unanticipated grammatical, syntax, homophones, and other interpretative errors are inadvertently transcribed by the computer software. Please disregard these errors. Please excuse any errors that have escaped final proofreading.

## 2022-04-27 PROBLEM — Z00.8 TILT TABLE EVALUATION: Status: RESOLVED | Noted: 2022-03-28 | Resolved: 2022-04-27

## 2022-06-13 ENCOUNTER — TELEPHONE (OUTPATIENT)
Dept: FAMILY MEDICINE CLINIC | Age: 22
End: 2022-06-13

## 2022-06-13 NOTE — TELEPHONE ENCOUNTER
Tc to the pt returning her call. She had called the cbn phone today and left a message. Kandis Oak was the . The pt has questions with medications for BP, and stated she comes to the [facility] for Specialist, and for all of her labs. She stated she took a pregnancy test 15 days ago and is pregnant, and wanted to know what to do with her medicines she is taking. The pt was recommended to contact her Cardiologist. The pt stated she was on BP medications. The pt chart was reviewed and the pt it is not noted any BP medications except the Cardiologist had ordered Florinef. The pt was unable to read the label or read the letters off the label. The pt was given the phone # of her Cardiologist. She was told her Cardiologist is not at the [facility] but he is located in the same building, just a different [address]. The pt is given an appt with the [facility] to confirm her pregnancy, and to assist her with referral to [facility] and scheduling of appt and give her the address and phone #. The pt can also receive [facility] info at this time if needed. The registrar was informed of this appt. I asked her to check and make sure it is ok.   Karina Jacobsen RN

## 2022-06-16 ENCOUNTER — TELEPHONE (OUTPATIENT)
Dept: CARDIOLOGY CLINIC | Age: 22
End: 2022-06-16

## 2022-06-16 NOTE — TELEPHONE ENCOUNTER
Patient walked in(Serbian Speaking patient-Interpreting Frog used) requesting a new blood pressure medication be called into her local pharmacy due to her recent Positive pregnancy test.  *Patient Pharmacy confirmed.   *Patient can be reached at #[phone](QAW Czech)    Thanks  Riya English

## 2022-06-17 NOTE — TELEPHONE ENCOUNTER
Verified Patient with two identifiers  Spoke with patient regarding recommendation to Stop florinef. Patient voiced understanding.

## 2022-07-13 ENCOUNTER — HOSPITAL ENCOUNTER (OUTPATIENT)
Dept: LAB | Age: 22
Discharge: HOME | End: 2022-07-13
Payer: MEDICAID

## 2022-07-13 ENCOUNTER — INITIAL PRENATAL (OUTPATIENT)
Dept: FAMILY MEDICINE CLINIC | Age: 22
End: 2022-07-13

## 2022-07-13 VITALS
OXYGEN SATURATION: 98 % | BODY MASS INDEX: 18.52 KG/M2 | WEIGHT: 98 LBS | DIASTOLIC BLOOD PRESSURE: 64 MMHG | SYSTOLIC BLOOD PRESSURE: 98 MMHG | RESPIRATION RATE: 16 BRPM

## 2022-07-13 DIAGNOSIS — I95.9 HYPOTENSION, UNSPECIFIED HYPOTENSION TYPE: ICD-10-CM
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
## 2022-07-13 DIAGNOSIS — Z34.90 ENCOUNTER FOR SUPERVISION OF NORMAL PREGNANCY, ANTEPARTUM, UNSPECIFIED GRAVIDITY: Primary | ICD-10-CM

## 2022-07-13 DIAGNOSIS — T74.22XA SEXUAL ASSAULT OF CHILD BY BODILY FORCE BY PARENT: ICD-10-CM

## 2022-07-13 LAB
ABO + RH BLD: NORMAL
ANTIBODY SCREEN, EXTERNAL: NEGATIVE
BLOOD BANK CMNT PATIENT-IMP: NORMAL
BLOOD GROUP ANTIBODIES SERPL: NORMAL
CHLAMYDIA, EXTERNAL: NEGATIVE
ERYTHROCYTE [DISTWIDTH] IN BLOOD BY AUTOMATED COUNT: 13.2 % (ref 11.5–14.5)
HBSAG, EXTERNAL: NEGATIVE
HBV SURFACE AG SER QL: <0.1 INDEX
HBV SURFACE AG SER QL: NEGATIVE
HCT VFR BLD AUTO: 39 % (ref 35–47)
HCV AB SERPL QL IA: NONREACTIVE
HEPATITIS C AB,   EXT: NONREACTIVE
HGB BLD-MCNC: 13.4 G/DL (ref 11.5–16)
HIV 1+2 AB+HIV1 P24 AG SERPL QL IA: NONREACTIVE
HIV, EXTERNAL: NONREACTIVE
HIV12 RESULT COMMENT, HHIVC: NORMAL
MCH RBC QN AUTO: 28.9 PG (ref 26–34)
MCHC RBC AUTO-ENTMCNC: 34.4 G/DL (ref 30–36.5)
MCV RBC AUTO: 84.2 FL (ref 80–99)
N. GONORRHEA, EXTERNAL: NEGATIVE
NRBC # BLD: 0 K/UL (ref 0–0.01)
NRBC BLD-RTO: 0 PER 100 WBC
PLATELET # BLD AUTO: 200 K/UL (ref 150–400)
PMV BLD AUTO: 9.9 FL (ref 8.9–12.9)
RBC # BLD AUTO: 4.63 M/UL (ref 3.8–5.2)
RUBELLA, EXTERNAL: NORMAL
RUBV IGG SER-IMP: REACTIVE
RUBV IGG SERPL IA-ACNC: 27.1 IU/ML
SPECIMEN EXP DATE BLD: NORMAL
T. PALLIDUM, EXTERNAL: NONREACTIVE
TYPE, ABO & RH, EXTERNAL: NORMAL
WBC # BLD AUTO: 8.8 K/UL (ref 3.6–11)

## 2022-07-13 PROCEDURE — 87491 CHLMYD TRACH DNA AMP PROBE: CPT

## 2022-07-13 PROCEDURE — 88175 CYTOPATH C/V AUTO FLUID REDO: CPT

## 2022-07-13 PROCEDURE — 0500F INITIAL PRENATAL CARE VISIT: CPT | Performed by: FAMILY MEDICINE
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
## 2022-07-13 NOTE — PROGRESS NOTES
Chief Complaint   Patient presents with    Initial Prenatal Visit       Patient identified with 2 patient identifiers (name and D. O. B)    Patient is a  at 12w0d    Leakage of Fluid: NO  Vaginal Bleeding: NO  Fetal Movement: not yet  Prenatal vitamins: YES  Having Contractions: NO  Pain: NO    Visit Vitals  BP 98/64 (BP 1 Location: Left upper arm, BP Patient Position: Sitting, BP Cuff Size: Adult)   Resp 16   Wt 98 lb (44.5 kg)   LMP 2022   SpO2 98%   BMI 18.52 kg/m²         There is no immunization history on file for this patient. 1. Have you been to the ER, urgent care clinic since your last visit? Hospitalized since your last visit? No    2. Have you seen or consulted any other health care providers outside of the [address] [place] [address] since your last visit? Include any pap smears or colon screening.  No

## 2022-07-14 LAB
BACTERIA SPEC CULT: NORMAL
SERVICE CMNT-IMP: NORMAL
VZV IGG SER IA-ACNC: <135 INDEX

## 2022-07-15 LAB
HGB A MFR BLD: 97.2 % (ref 96.4–98.8)
HGB A2 MFR BLD COLUMN CHROM: 2.8 % (ref 1.8–3.2)
HGB F MFR BLD: 0 % (ref 0–2)
HGB FRACT BLD-IMP: NORMAL
HGB S MFR BLD: 0 %
T PALLIDUM AB SER QL IA: NON REACTIVE

## 2022-07-19 LAB
C TRACH RRNA SPEC QL NAA+PROBE: NEGATIVE
N GONORRHOEA RRNA SPEC QL NAA+PROBE: NEGATIVE
SPECIMEN SOURCE: NORMAL

## 2022-08-19 ENCOUNTER — ROUTINE PRENATAL (OUTPATIENT)
Dept: FAMILY MEDICINE CLINIC | Age: 22
End: 2022-08-19
Payer: MEDICAID

## 2022-08-19 VITALS
HEART RATE: 73 BPM | WEIGHT: 103 LBS | OXYGEN SATURATION: 99 % | BODY MASS INDEX: 19.46 KG/M2 | DIASTOLIC BLOOD PRESSURE: 57 MMHG | SYSTOLIC BLOOD PRESSURE: 92 MMHG | RESPIRATION RATE: 16 BRPM

## 2022-08-19 DIAGNOSIS — R30.0 DYSURIA: ICD-10-CM

## 2022-08-19 DIAGNOSIS — Z34.90 ENCOUNTER FOR SUPERVISION OF NORMAL PREGNANCY, ANTEPARTUM, UNSPECIFIED GRAVIDITY: Primary | ICD-10-CM

## 2022-08-19 DIAGNOSIS — B37.31 VAGINAL YEAST INFECTION: ICD-10-CM

## 2022-08-19 LAB
BILIRUB UR QL STRIP: NEGATIVE
GLUCOSE UR-MCNC: NEGATIVE MG/DL
KETONES P FAST UR STRIP-MCNC: NEGATIVE MG/DL
PH UR STRIP: 7 [PH] (ref 4.6–8)
PROT UR QL STRIP: NEGATIVE
SP GR UR STRIP: 1.02 (ref 1–1.03)
UA UROBILINOGEN AMB POC: NORMAL (ref 0.2–1)
URINALYSIS CLARITY POC: CLEAR
URINALYSIS COLOR POC: YELLOW
URINE BLOOD POC: NEGATIVE
URINE LEUKOCYTES POC: NORMAL
URINE NITRITES POC: NEGATIVE

## 2022-08-19 PROCEDURE — 0502F SUBSEQUENT PRENATAL CARE: CPT | Performed by: FAMILY MEDICINE

## 2022-08-19 PROCEDURE — 81003 URINALYSIS AUTO W/O SCOPE: CPT | Performed by: FAMILY MEDICINE

## 2022-08-19 RX ORDER — MICONAZOLE NITRATE 2 %
1 CREAM WITH APPLICATOR VAGINAL
Qty: 45 G | Refills: 0 | Status: SHIPPED | OUTPATIENT
Start: 2022-08-19
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
## 2022-08-19 NOTE — PROGRESS NOTES
Chief Complaint   Patient presents with    Routine Prenatal Visit       Patient identified with 2 patient identifiers (name and D. O. B)    Patient is a  at 18w5d    Leakage of Fluid: NO  Vaginal Bleeding: Small amount of spotting yesterday and day before. No more bleeding today. Fetal Movement: YES  Prenatal vitamins: YES  Having Contractions: NO  Pain: Back pain and headaches. Visit Vitals  BP (!) 92/57 (BP 1 Location: Left upper arm, BP Patient Position: Sitting, BP Cuff Size: Small adult)   Pulse 73   Resp 16   Wt 103 lb (46.7 kg)   LMP 2022   SpO2 99%   BMI 19.46 kg/m²         There is no immunization history on file for this patient. 1. Have you been to the ER, urgent care clinic since your last visit? Hospitalized since your last visit? No    2. Have you seen or consulted any other health care providers outside of the [address] [place] [address] since your last visit? Include any pap smears or colon screening.  No

## 2022-08-20 LAB
BACTERIA SPEC CULT: NORMAL
SERVICE CMNT-IMP: NORMAL

## 2022-08-24 LAB
C TRACH RRNA SPEC QL NAA+PROBE: NEGATIVE
N GONORRHOEA RRNA SPEC QL NAA+PROBE: NEGATIVE
SPECIMEN SOURCE: NORMAL
T VAGINALIS RRNA SPEC QL NAA+PROBE: NEGATIVE

## 2022-09-09 ENCOUNTER — HOSPITAL ENCOUNTER (OUTPATIENT)
Dept: PERINATAL CARE | Age: 22
Discharge: HOME | End: 2022-09-09
Attending: OBSTETRICS & GYNECOLOGY
Payer: MEDICAID

## 2022-09-09 PROCEDURE — 76811 OB US DETAILED SNGL FETUS: CPT | Performed by: OBSTETRICS & GYNECOLOGY

## 2022-09-16 ENCOUNTER — ROUTINE PRENATAL (OUTPATIENT)
Dept: FAMILY MEDICINE CLINIC | Age: 22
End: 2022-09-16
Payer: MEDICAID

## 2022-09-16 VITALS
HEART RATE: 75 BPM | WEIGHT: 109 LBS | DIASTOLIC BLOOD PRESSURE: 59 MMHG | BODY MASS INDEX: 20.6 KG/M2 | SYSTOLIC BLOOD PRESSURE: 91 MMHG | OXYGEN SATURATION: 98 % | RESPIRATION RATE: 16 BRPM

## 2022-09-16 DIAGNOSIS — Z34.90 ENCOUNTER FOR SUPERVISION OF NORMAL PREGNANCY, ANTEPARTUM, UNSPECIFIED GRAVIDITY: Primary | ICD-10-CM

## 2022-09-16 DIAGNOSIS — T74.22XA SEXUAL ASSAULT OF CHILD BY BODILY FORCE BY PARENT: ICD-10-CM
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
## 2022-09-16 DIAGNOSIS — I95.9 HYPOTENSION, UNSPECIFIED HYPOTENSION TYPE: ICD-10-CM

## 2022-09-16 PROCEDURE — 0502F SUBSEQUENT PRENATAL CARE: CPT | Performed by: FAMILY MEDICINE
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
## 2022-09-20 ENCOUNTER — HOSPITAL ENCOUNTER (OUTPATIENT)
Dept: PERINATAL CARE | Age: 22
Discharge: HOME | End: 2022-09-20
Attending: OBSTETRICS & GYNECOLOGY
Payer: MEDICAID

## 2022-09-20 PROCEDURE — 76816 OB US FOLLOW-UP PER FETUS: CPT | Performed by: OBSTETRICS & GYNECOLOGY

## 2022-10-14 ENCOUNTER — ROUTINE PRENATAL (OUTPATIENT)
Dept: FAMILY MEDICINE CLINIC | Age: 22
End: 2022-10-14
Payer: MEDICAID

## 2022-10-14 VITALS
RESPIRATION RATE: 16 BRPM | HEART RATE: 85 BPM | DIASTOLIC BLOOD PRESSURE: 57 MMHG | SYSTOLIC BLOOD PRESSURE: 90 MMHG | OXYGEN SATURATION: 100 %

## 2022-10-14 DIAGNOSIS — T74.22XA SEXUAL ASSAULT OF CHILD BY BODILY FORCE BY PARENT: ICD-10-CM

## 2022-10-14 DIAGNOSIS — Z34.90 ENCOUNTER FOR SUPERVISION OF NORMAL PREGNANCY, ANTEPARTUM, UNSPECIFIED GRAVIDITY: Primary | ICD-10-CM
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
## 2022-10-14 DIAGNOSIS — I95.9 HYPOTENSION, UNSPECIFIED HYPOTENSION TYPE: ICD-10-CM

## 2022-10-14 PROCEDURE — 0502F SUBSEQUENT PRENATAL CARE: CPT | Performed by: FAMILY MEDICINE

## 2022-10-14 NOTE — PROGRESS NOTES
Chief Complaint   Patient presents with    Routine Prenatal Visit       Patient identified with 2 patient identifiers (name and D. O. B)    Patient is a  at 26w5d    Leakage of Fluid: NO  Vaginal Bleeding: NO  Fetal Movement: YES  Prenatal vitamins: YES  Having Contractions: NO  Pain: NO    Visit Vitals  LMP 2022         There is no immunization history on file for this patient. 1. Have you been to the ER, urgent care clinic since your last visit? Hospitalized since your last visit? No    2. Have you seen or consulted any other health care providers outside of the [address] [place] [address] since your last visit? Include any pap smears or colon screening.  No
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
## 2022-10-15 LAB
ERYTHROCYTE [DISTWIDTH] IN BLOOD BY AUTOMATED COUNT: 12.5 % (ref 11.7–15.4)
GLUCOSE 1H P 50 G GLC PO SERPL-MCNC: 160 MG/DL (ref 70–139)
HCT VFR BLD AUTO: 33 % (ref 34–46.6)
HGB BLD-MCNC: 11 G/DL (ref 11.1–15.9)
MCH RBC QN AUTO: 29.2 PG (ref 26.6–33)
MCHC RBC AUTO-ENTMCNC: 33.3 G/DL (ref 31.5–35.7)
MCV RBC AUTO: 88 FL (ref 79–97)
PLATELET # BLD AUTO: 259 X10E3/UL (ref 150–450)
RBC # BLD AUTO: 3.77 X10E6/UL (ref 3.77–5.28)
WBC # BLD AUTO: 11.2 X10E3/UL (ref 3.4–10.8)

## 2022-10-17 DIAGNOSIS — Z34.90 ENCOUNTER FOR SUPERVISION OF NORMAL PREGNANCY, ANTEPARTUM, UNSPECIFIED GRAVIDITY: Primary | ICD-10-CM

## 2022-10-18 NOTE — PROGRESS NOTES
Estimated Date of Delivery: 1/15/23  Normal anatomy but limited, situs needs to be confirmed  Placenta low lying  Hx demise at \"8 mo\" due to Chikungunya virus (PTL due to maternal sepsis per mom's description)

## 2022-10-26 ENCOUNTER — LAB ONLY (OUTPATIENT)
Dept: FAMILY MEDICINE CLINIC | Age: 22
End: 2022-10-26

## 2022-10-26 DIAGNOSIS — Z34.90 ENCOUNTER FOR SUPERVISION OF NORMAL PREGNANCY, ANTEPARTUM, UNSPECIFIED GRAVIDITY: ICD-10-CM

## 2022-10-27 LAB
GLUCOSE 1H P 100 G GLC PO SERPL-MCNC: 171 MG/DL (ref 70–179)
GLUCOSE 2H P 100 G GLC PO SERPL-MCNC: 156 MG/DL (ref 70–154)
GLUCOSE 3H P 100 G GLC PO SERPL-MCNC: 137 MG/DL (ref 70–139)
GLUCOSE P FAST SERPL-MCNC: 87 MG/DL (ref 70–94)
NOTE:, 102047: ABNORMAL

## 2022-11-07 ENCOUNTER — OFFICE VISIT (OUTPATIENT)
Dept: CARDIOLOGY CLINIC | Age: 22
End: 2022-11-07
Payer: MEDICAID

## 2022-11-07 VITALS
HEIGHT: 61 IN | SYSTOLIC BLOOD PRESSURE: 92 MMHG | BODY MASS INDEX: 22.13 KG/M2 | HEART RATE: 107 BPM | OXYGEN SATURATION: 99 % | DIASTOLIC BLOOD PRESSURE: 58 MMHG | WEIGHT: 117.2 LBS | RESPIRATION RATE: 18 BRPM

## 2022-11-07 DIAGNOSIS — Z3A.24 24 WEEKS GESTATION OF PREGNANCY: ICD-10-CM

## 2022-11-07 DIAGNOSIS — I95.9 HYPOTENSION, UNSPECIFIED HYPOTENSION TYPE: Primary | ICD-10-CM

## 2022-11-07 DIAGNOSIS — R06.02 SOB (SHORTNESS OF BREATH): ICD-10-CM

## 2022-11-07 PROCEDURE — 93000 ELECTROCARDIOGRAM COMPLETE: CPT | Performed by: SPECIALIST

## 2022-11-07 PROCEDURE — 99214 OFFICE O/P EST MOD 30 MIN: CPT | Performed by: SPECIALIST

## 2022-11-07 NOTE — PROGRESS NOTES
[address] [facility]                                                            OFFICE NOTE        Katarzyna Gentile M.D.,TRINO Silvia Cartagena   2000  [id number]    Date/Time:  11/7/20229:14 AM            SUBJECTIVE:  She is now 6 months pregnant. She complains of some headache and fever and occasional shortness of breath lying down. No presyncopal syncopal episode reported occasional dizziness but it gets better with drinking water. Assessment/Plan  1. Orthostatic hypotension: Patient status post tilt table test in March 2022 revealing orthostatic hypotension with reflex tachycardia. Started on Florinef. Patient now off Florinef since she is pregnant. This will be discontinued for now. Continue with aggressive hydration and salt intake as long as the blood pressure is acceptable. No recurrent syncopal episode at this time. Blood pressure remains on the low side of normal.     To be noted she also has undergone a event monitor which was unrevealing in terms of any kind of dysrhythmias and only sinus tachycardia was noted. I encouraged to increase the fluid intake and salt intake. To be noted that the transthoracic echocardiogram was essentially normal in March 2022. Now with shortness of breath lying down. I suspect possibly in part related to pregnancy but proceed with echocardiac to ascertain ejection fraction. Results of the echocardiogram will be reported over the phone. No additional cardiac interventions for now. I will see her back in 6 months if echocardiogram is within normal limits and or sooner if any question problems arise prior to that        HPI   Very pleasant 20years old female with unremarkable past medical history who presents for cardiac evaluation of the palpitations.  Apparently the patient was seen at [facility] for palpitations and she was told she had an arrhythmia but they were not sure what it was released she does not know what it was. She does have dizziness discomfort in the left arm when she does have the palpitations. There is no report of syncopal episode at this point. There is no history of hypertension hyperlipidemia diabetes congenital heart disease that she knows of. She does not drink or smoke. Currently not working or studying. To table test on March 2000 showed orthostatic hypotension with a reflex tachycardia. CARDIAC STUDIES        02/25/22    ECHO ADULT COMPLETE 03/01/2022 3/1/2022    Interpretation Summary    Left Ventricle: Left ventricle size is normal. Normal wall thickness. Normal wall motion. Normal left ventricular systolic function. EF by 2D Simpsons Biplane is 63%. Normal diastolic function. Tricuspid Valve: Valve structure is normal. Trace transvalvular regurgitation. Signed by: Jane Ochoa MD on 3/1/2022  4:33 PM                              EKG Results       None                IMAGING      MRI Results (most recent):  No results found for this or any previous visit. CT Results (most recent):  No results found for this or any previous visit. XR Results (most recent):  Results from Abstract encounter on 08/17/22    XR SHOULDER LT AP/LAT MIN 2 V          No past medical history on file. No past surgical history on file. Social History     Tobacco Use    Smoking status: Never    Smokeless tobacco: Never   Vaping Use    Vaping Use: Never used   Substance Use Topics    Alcohol use: Never    Drug use: Never     No family history on file. No Known Allergies      Visit Vitals  LMP 04/20/2022         There were no vitals filed for this visit.    Visit Vitals  BP (!) 92/58 (BP 1 Location: Left upper arm, BP Patient Position: Sitting, BP Cuff Size: Adult)   Pulse (!) 107   Resp 18   Ht 5' 1\" (1.549 m)   Wt 117 lb 3.2 oz (53.2 kg)   LMP 04/20/2022   SpO2 99% BMI 22.14 kg/m²           Review of Systems:   Pertinent items are noted in the History of Present Illness. Visit Vitals  BP (!) 92/58 (BP 1 Location: Left upper arm, BP Patient Position: Sitting, BP Cuff Size: Adult)   Pulse (!) 107   Resp 18   Ht 5' 1\" (1.549 m)   Wt 117 lb 3.2 oz (53.2 kg)   LMP 04/20/2022   SpO2 99%   BMI 22.14 kg/m²     General Appearance:  Well developed, well nourished,alert and oriented x 3, and individual in no acute distress. Ears/Nose/Mouth/Throat:   Hearing grossly normal.         Neck: Supple. Chest:   Lungs clear to auscultation bilaterally. Cardiovascular:  Regular rate and rhythm, S1, S2 normal, no murmur. Abdomen:   Soft, non-tender, bowel sounds are active. Extremities: No edema bilaterally. Skin: Warm and dry. Current Outpatient Medications on File Prior to Visit   Medication Sig Dispense Refill    prenatal vit-iron fumarate-fa 27 mg iron- 0.8 mg tab tablet Take 1 Tablet by mouth daily. 90 Tablet 3    miconazole (MICOTIN) 2 % vaginal cream Insert 1 Applicator into vagina nightly. (Patient not taking: No sig reported) 45 g 0     No current facility-administered medications on file prior to visit. Estefany Wesley had no medications administered during this visit. Current Outpatient Medications   Medication Sig    prenatal vit-iron fumarate-fa 27 mg iron- 0.8 mg tab tablet Take 1 Tablet by mouth daily. miconazole (MICOTIN) 2 % vaginal cream Insert 1 Applicator into vagina nightly. (Patient not taking: No sig reported)     No current facility-administered medications for this visit.          No results found for: CHOL, CHOLPOCT, CHOLX, CHLST, CHOLV, HDL, HDLPOC, HDLP, LDL, LDLCPOC, LDLC, DLDLP, VLDLC, VLDL, TGLX, TRIGL, TRIGP, TGLPOCT, CHHD, CHHDX    Lab Results   Component Value Date/Time    Sodium 138 03/24/2022 10:20 AM    Potassium 4.1 03/24/2022 10:20 AM    Chloride 105 03/24/2022 10:20 AM    CO2 27 03/24/2022 10:20 AM Anion gap 6 03/24/2022 10:20 AM    Glucose 93 03/24/2022 10:20 AM    Glucose - Fasting 87 10/26/2022 12:00 AM    BUN 13 03/24/2022 10:20 AM    Creatinine 0.61 03/24/2022 10:20 AM    BUN/Creatinine ratio 21 (H) 03/24/2022 10:20 AM    GFR est AA >60 03/24/2022 10:20 AM    GFR est non-AA >60 03/24/2022 10:20 AM    Calcium 9.2 03/24/2022 10:20 AM       Lab Results   Component Value Date/Time    ALT (SGPT) 22 03/24/2022 10:20 AM    Alk. phosphatase 99 03/24/2022 10:20 AM    Bilirubin, total 0.4 03/24/2022 10:20 AM       Lab Results   Component Value Date/Time    WBC 11.2 (H) 10/14/2022 10:46 AM    Hemoglobin (POC) 14.2 02/21/2022 11:44 AM    HGB 11.0 (L) 10/14/2022 10:46 AM    HCT 33.0 (L) 10/14/2022 10:46 AM    PLATELET 928 14/83/8297 10:46 AM    MCV 88 10/14/2022 10:46 AM       No results found for: TSH, TSH2, TSH3, TSHP, TSHEXT      No results found for: CHOL, CHOLPOCT, CHOLX, CHLST, CHOLV, HDL, HDLP, LDL, LDLC, DLDLP, TGLX, TRIGL, TRIGP, TGLPOCT, NTGLT, CHHD, CHHDX             Please note that this dictation was completed with Honk, the Be[employer] voice recognition software. Quite often unanticipated grammatical, syntax, homophones, and other interpretative errors are inadvertently transcribed by the computer software. Please disregard these errors. Please excuse any errors that have escaped final proofreading.

## 2022-11-07 NOTE — PATIENT INSTRUCTIONS
Schedule echocardiogram at earliest availability in hospital.  Please call central scheduling at [phone]. Our office will call you with results. Follow up with Dr. Garret Silva in 6 months.

## 2022-11-07 NOTE — PROGRESS NOTES
Visit Vitals  BP (!) 92/58 (BP 1 Location: Left upper arm, BP Patient Position: Sitting, BP Cuff Size: Adult)   Pulse (!) 107   Resp 18   Ht 5' 1\" (1.549 m)   Wt 117 lb 3.2 oz (53.2 kg)   LMP 04/20/2022   SpO2 99%   BMI 22.14 kg/m²

## 2022-11-30 ENCOUNTER — ROUTINE PRENATAL (OUTPATIENT)
Dept: FAMILY MEDICINE CLINIC | Age: 22
End: 2022-11-30
Payer: MEDICAID

## 2022-11-30 VITALS
TEMPERATURE: 97.8 F | RESPIRATION RATE: 16 BRPM | SYSTOLIC BLOOD PRESSURE: 91 MMHG | HEIGHT: 61 IN | OXYGEN SATURATION: 99 % | WEIGHT: 121 LBS | BODY MASS INDEX: 22.84 KG/M2 | DIASTOLIC BLOOD PRESSURE: 58 MMHG | HEART RATE: 82 BPM

## 2022-11-30 DIAGNOSIS — N89.8 VAGINAL DISCHARGE DURING PREGNANCY IN THIRD TRIMESTER: ICD-10-CM

## 2022-11-30 DIAGNOSIS — O26.893 VAGINAL DISCHARGE DURING PREGNANCY IN THIRD TRIMESTER: ICD-10-CM

## 2022-11-30 DIAGNOSIS — Z34.83 ENCOUNTER FOR SUPERVISION OF OTHER NORMAL PREGNANCY IN THIRD TRIMESTER: Primary | ICD-10-CM

## 2022-11-30 LAB — WET MOUNT POCT, WMPOCT: NORMAL

## 2022-11-30 PROCEDURE — 87210 SMEAR WET MOUNT SALINE/INK: CPT | Performed by: FAMILY MEDICINE

## 2022-11-30 PROCEDURE — 90471 IMMUNIZATION ADMIN: CPT | Performed by: FAMILY MEDICINE

## 2022-11-30 PROCEDURE — 90715 TDAP VACCINE 7 YRS/> IM: CPT | Performed by: FAMILY MEDICINE

## 2022-11-30 PROCEDURE — 0502F SUBSEQUENT PRENATAL CARE: CPT | Performed by: FAMILY MEDICINE

## 2022-11-30 RX ORDER — ASPIRIN 325 MG
1 TABLET, DELAYED RELEASE (ENTERIC COATED) ORAL
Qty: 45 G | Refills: 0 | Status: SHIPPED | OUTPATIENT
Start: 2022-11-30 | End: 2022-12-07
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
## 2022-11-30 NOTE — PROGRESS NOTES
Dorothea Manrique is a 20 y.o. female    Chief Complaint   Patient presents with    Routine Prenatal Visit     Patient is 33 weeks and 3 days. She is not having vaginal bleeding. She is having thick white discharge with itchiness and odor. She is having fetal movement. She is taking her prenatal vitamins. No contractions. She is aware about TDAP vaccine today. She declined the flu shot. No other concerns. 1. Have you been to the ER, urgent care clinic since your last visit? Hospitalized since your last visit? No    2. Have you seen or consulted any other health care providers outside of the [address] [place] [address] since your last visit? Include any pap smears or colon screening. No      Visit Vitals  BP (!) 91/58 (BP 1 Location: Right upper arm, BP Patient Position: Sitting)   Pulse 82   Temp 97.8 °F (36.6 °C) (Oral)   Resp 16   Ht 5' 1\" (1.549 m)   Wt 121 lb (54.9 kg)   SpO2 99%   BMI 22.86 kg/m²           Health Maintenance Due   Topic Date Due    COVID-19 Vaccine (1) Never done    HPV Age 9Y-34Y (1 - 2-dose series) Never done    DTaP/Tdap/Td series (1 - Tdap) Never done    Flu Vaccine (1) Never done    OB 3RD TRIMESTER TDAP  Never done         Medication Reconciliation completed, changes noted.   Please  Update medication list.

## 2022-12-01 LAB
ERYTHROCYTE [DISTWIDTH] IN BLOOD BY AUTOMATED COUNT: 13 % (ref 11.7–15.4)
HCT VFR BLD AUTO: 35.9 % (ref 34–46.6)
HGB BLD-MCNC: 12.2 G/DL (ref 11.1–15.9)
MCH RBC QN AUTO: 28.6 PG (ref 26.6–33)
MCHC RBC AUTO-ENTMCNC: 34 G/DL (ref 31.5–35.7)
MCV RBC AUTO: 84 FL (ref 79–97)
PLATELET # BLD AUTO: 237 X10E3/UL (ref 150–450)
RBC # BLD AUTO: 4.27 X10E6/UL (ref 3.77–5.28)
WBC # BLD AUTO: 11.2 X10E3/UL (ref 3.4–10.8)

## 2022-12-08 ENCOUNTER — HOSPITAL ENCOUNTER (OUTPATIENT)
Dept: NON INVASIVE DIAGNOSTICS | Age: 22
Discharge: HOME | End: 2022-12-08
Attending: SPECIALIST
Payer: MEDICAID

## 2022-12-08 VITALS
BODY MASS INDEX: 22.84 KG/M2 | DIASTOLIC BLOOD PRESSURE: 58 MMHG | HEIGHT: 61 IN | WEIGHT: 121 LBS | SYSTOLIC BLOOD PRESSURE: 91 MMHG

## 2022-12-08 DIAGNOSIS — R06.02 SOB (SHORTNESS OF BREATH): ICD-10-CM

## 2022-12-08 DIAGNOSIS — Z3A.24 24 WEEKS GESTATION OF PREGNANCY: ICD-10-CM

## 2022-12-08 LAB
ECHO AO ASC DIAM: 2.4 CM
ECHO AO ASCENDING AORTA INDEX: 1.57 CM/M2
ECHO AV AREA PEAK VELOCITY: 2.4 CM2
ECHO AV AREA VTI: 2.6 CM2
ECHO AV AREA/BSA PEAK VELOCITY: 1.6 CM2/M2
ECHO AV AREA/BSA VTI: 1.7 CM2/M2
ECHO AV MEAN GRADIENT: 3 MMHG
ECHO AV MEAN VELOCITY: 0.8 M/S
ECHO AV PEAK GRADIENT: 6 MMHG
ECHO AV PEAK VELOCITY: 1.2 M/S
ECHO AV VELOCITY RATIO: 0.75
ECHO AV VTI: 21.7 CM
ECHO LA DIAMETER INDEX: 1.9 CM/M2
ECHO LA DIAMETER: 2.9 CM
ECHO LA VOL 2C: 15 ML (ref 22–52)
ECHO LA VOL 4C: 18 ML (ref 22–52)
ECHO LA VOL BP: 18 ML (ref 22–52)
ECHO LA VOL/BSA BIPLANE: 12 ML/M2 (ref 16–34)
ECHO LA VOLUME AREA LENGTH: 19 ML
ECHO LA VOLUME INDEX A2C: 10 ML/M2 (ref 16–34)
ECHO LA VOLUME INDEX A4C: 12 ML/M2 (ref 16–34)
ECHO LA VOLUME INDEX AREA LENGTH: 12 ML/M2 (ref 16–34)
ECHO LV E' LATERAL VELOCITY: 14 CM/S
ECHO LV E' SEPTAL VELOCITY: 11 CM/S
ECHO LV FRACTIONAL SHORTENING: 28 % (ref 28–44)
ECHO LV INTERNAL DIMENSION DIASTOLE INDEX: 2.55 CM/M2
ECHO LV INTERNAL DIMENSION DIASTOLIC: 3.9 CM (ref 3.9–5.3)
ECHO LV INTERNAL DIMENSION SYSTOLIC INDEX: 1.83 CM/M2
ECHO LV INTERNAL DIMENSION SYSTOLIC: 2.8 CM
ECHO LV IVSD: 0.9 CM (ref 0.6–0.9)
ECHO LV MASS 2D: 105.3 G (ref 67–162)
ECHO LV MASS INDEX 2D: 68.8 G/M2 (ref 43–95)
ECHO LV POSTERIOR WALL DIASTOLIC: 0.9 CM (ref 0.6–0.9)
ECHO LV RELATIVE WALL THICKNESS RATIO: 0.46
ECHO LVOT AREA: 3.1 CM2
ECHO LVOT AV VTI INDEX: 0.85
ECHO LVOT DIAM: 2 CM
ECHO LVOT MEAN GRADIENT: 2 MMHG
ECHO LVOT PEAK GRADIENT: 4 MMHG
ECHO LVOT PEAK VELOCITY: 0.9 M/S
ECHO LVOT STROKE VOLUME INDEX: 38 ML/M2
ECHO LVOT SV: 58.1 ML
ECHO LVOT VTI: 18.5 CM
ECHO MV A VELOCITY: 0.51 M/S
ECHO MV E DECELERATION TIME (DT): 218.3 MS
ECHO MV E VELOCITY: 0.72 M/S
ECHO MV E/A RATIO: 1.41
ECHO MV E/E' LATERAL: 5.14
ECHO MV E/E' RATIO (AVERAGED): 5.84
ECHO MV E/E' SEPTAL: 6.55
ECHO MV REGURGITANT PEAK GRADIENT: 44 MMHG
ECHO MV REGURGITANT PEAK VELOCITY: 3.3 M/S
ECHO PULMONARY ARTERY END DIASTOLIC PRESSURE: 4 MMHG
ECHO PV MAX VELOCITY: 1 M/S
ECHO PV PEAK GRADIENT: 4 MMHG
ECHO PV REGURGITANT MAX VELOCITY: 1 M/S
ECHO RV INTERNAL DIMENSION: 2.8 CM
ECHO RV TAPSE: 2.4 CM (ref 1.7–?)
ECHO RVOT PEAK GRADIENT: 2 MMHG
ECHO RVOT PEAK VELOCITY: 0.8 M/S
ECHO TV REGURGITANT MAX VELOCITY: 2.18 M/S
ECHO TV REGURGITANT PEAK GRADIENT: 19 MMHG

## 2022-12-08 PROCEDURE — 93306 TTE W/DOPPLER COMPLETE: CPT

## 2022-12-08 PROCEDURE — 93306 TTE W/DOPPLER COMPLETE: CPT | Performed by: INTERNAL MEDICINE

## 2022-12-16 ENCOUNTER — ROUTINE PRENATAL (OUTPATIENT)
Dept: FAMILY MEDICINE CLINIC | Age: 22
End: 2022-12-16
Payer: MEDICAID

## 2022-12-16 VITALS
HEART RATE: 82 BPM | RESPIRATION RATE: 16 BRPM | OXYGEN SATURATION: 98 % | SYSTOLIC BLOOD PRESSURE: 97 MMHG | WEIGHT: 126 LBS | BODY MASS INDEX: 23.81 KG/M2 | DIASTOLIC BLOOD PRESSURE: 62 MMHG

## 2022-12-16 DIAGNOSIS — T74.22XA SEXUAL ASSAULT OF CHILD BY BODILY FORCE BY PARENT: ICD-10-CM
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
## 2022-12-16 DIAGNOSIS — Z28.39 MATERNAL VARICELLA, NON-IMMUNE: ICD-10-CM

## 2022-12-16 DIAGNOSIS — Z34.83 ENCOUNTER FOR SUPERVISION OF OTHER NORMAL PREGNANCY IN THIRD TRIMESTER: Primary | ICD-10-CM

## 2022-12-16 DIAGNOSIS — O09.899 MATERNAL VARICELLA, NON-IMMUNE: ICD-10-CM
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
## 2022-12-16 NOTE — PROGRESS NOTES
Chief Complaint   Patient presents with    Routine Prenatal Visit       Patient identified with 2 patient identifiers (name and D. O. B)    Patient is a  at 35w5d    Leakage of Fluid: NO  Vaginal Bleeding: NO  Fetal Movement: YES  Prenatal vitamins: YES  Having Contractions: NO  Pain: Headache. Mild discomfort currently. Visit Vitals  BP 97/62 (BP 1 Location: Left arm, BP Patient Position: Sitting, BP Cuff Size: Adult)   Pulse 82   Resp 16   Wt 126 lb (57.2 kg)   LMP 2022   SpO2 98%   BMI 23.81 kg/m²       Immunization History   Administered Date(s) Administered    Tdap 2022       1. Have you been to the ER, urgent care clinic since your last visit? Hospitalized since your last visit? No    2. Have you seen or consulted any other health care providers outside of the [address] [place] [address] since your last visit? Include any pap smears or colon screening.  No

## 2022-12-23 ENCOUNTER — ROUTINE PRENATAL (OUTPATIENT)
Dept: FAMILY MEDICINE CLINIC | Age: 22
End: 2022-12-23
Payer: MEDICAID

## 2022-12-23 VITALS
OXYGEN SATURATION: 98 % | SYSTOLIC BLOOD PRESSURE: 97 MMHG | RESPIRATION RATE: 16 BRPM | HEART RATE: 69 BPM | DIASTOLIC BLOOD PRESSURE: 59 MMHG

## 2022-12-23 DIAGNOSIS — Z34.83 ENCOUNTER FOR SUPERVISION OF OTHER NORMAL PREGNANCY IN THIRD TRIMESTER: Primary | ICD-10-CM

## 2022-12-23 NOTE — PROGRESS NOTES
Chief Complaint   Patient presents with    Routine Prenatal Visit       Patient identified with 2 patient identifiers (name and D. O. B)    Patient is a  at 36w5d    Leakage of Fluid: NO  Vaginal Bleeding: NO  Fetal Movement: YES  Prenatal vitamins: YES  Having Contractions: NO  Pain: NO    Visit Vitals  BP (!) 97/59 (BP 1 Location: Left upper arm, BP Patient Position: Sitting, BP Cuff Size: Adult)   Pulse 69   Resp 16   LMP 2022   SpO2 98%       Immunization History   Administered Date(s) Administered    Tdap 2022       1. Have you been to the ER, urgent care clinic since your last visit? Hospitalized since your last visit? No    2. Have you seen or consulted any other health care providers outside of the [address] [place] [address] since your last visit? Include any pap smears or colon screening.  No
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
## 2022-12-25 ENCOUNTER — HOSPITAL ENCOUNTER (INPATIENT)
Age: 22
LOS: 2 days | Discharge: HOME | DRG: 560 | End: 2022-12-27
Attending: FAMILY MEDICINE | Admitting: FAMILY MEDICINE
Payer: MEDICAID

## 2022-12-25 DIAGNOSIS — Z34.80 SUPERVISION OF OTHER NORMAL PREGNANCY, ANTEPARTUM: ICD-10-CM

## 2022-12-25 DIAGNOSIS — T74.22XA SEXUAL ASSAULT OF CHILD BY BODILY FORCE BY PARENT: ICD-10-CM
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
## 2022-12-25 DIAGNOSIS — I95.9 HYPOTENSION, UNSPECIFIED HYPOTENSION TYPE: ICD-10-CM

## 2022-12-25 DIAGNOSIS — Z28.39 MATERNAL VARICELLA, NON-IMMUNE: ICD-10-CM

## 2022-12-25 DIAGNOSIS — O09.899 MATERNAL VARICELLA, NON-IMMUNE: ICD-10-CM

## 2022-12-25 LAB
A1 MICROGLOB PLACENTAL VAG QL: POSITIVE
ALBUMIN SERPL-MCNC: 2.7 G/DL (ref 3.5–5)
ALBUMIN/GLOB SERPL: 0.7 {RATIO} (ref 1.1–2.2)
ALP SERPL-CCNC: 191 U/L (ref 45–117)
ALT SERPL-CCNC: 18 U/L (ref 12–78)
ANION GAP SERPL CALC-SCNC: 9 MMOL/L (ref 5–15)
AST SERPL-CCNC: 19 U/L (ref 15–37)
BASOPHILS # BLD: 0 K/UL (ref 0–0.1)
BASOPHILS NFR BLD: 0 % (ref 0–1)
BILIRUB SERPL-MCNC: 0.2 MG/DL (ref 0.2–1)
BUN SERPL-MCNC: 8 MG/DL (ref 6–20)
BUN/CREAT SERPL: 12 (ref 12–20)
CALCIUM SERPL-MCNC: 8.6 MG/DL (ref 8.5–10.1)
CHLORIDE SERPL-SCNC: 106 MMOL/L (ref 97–108)
CO2 SERPL-SCNC: 22 MMOL/L (ref 21–32)
CONTROL LINE PRESENT?: NORMAL
CREAT SERPL-MCNC: 0.67 MG/DL (ref 0.55–1.02)
DIFFERENTIAL METHOD BLD: ABNORMAL
EOSINOPHIL # BLD: 0.1 K/UL (ref 0–0.4)
EOSINOPHIL NFR BLD: 1 % (ref 0–7)
ERYTHROCYTE [DISTWIDTH] IN BLOOD BY AUTOMATED COUNT: 13.7 % (ref 11.5–14.5)
EXPIRATION DATE: NORMAL
GLOBULIN SER CALC-MCNC: 3.9 G/DL (ref 2–4)
GLUCOSE SERPL-MCNC: 196 MG/DL (ref 65–100)
HCT VFR BLD AUTO: 36.6 % (ref 35–47)
HGB BLD-MCNC: 12.3 G/DL (ref 11.5–16)
IMM GRANULOCYTES # BLD AUTO: 0.2 K/UL (ref 0–0.04)
IMM GRANULOCYTES NFR BLD AUTO: 2 % (ref 0–0.5)
INTERNAL NEGATIVE CONTROL: NORMAL
KIT LOT NO.: NORMAL
LYMPHOCYTES # BLD: 2.2 K/UL (ref 0.8–3.5)
LYMPHOCYTES NFR BLD: 22 % (ref 12–49)
MCH RBC QN AUTO: 28.2 PG (ref 26–34)
MCHC RBC AUTO-ENTMCNC: 33.6 G/DL (ref 30–36.5)
MCV RBC AUTO: 83.9 FL (ref 80–99)
MONOCYTES # BLD: 0.8 K/UL (ref 0–1)
MONOCYTES NFR BLD: 8 % (ref 5–13)
NEUTS SEG # BLD: 6.5 K/UL (ref 1.8–8)
NEUTS SEG NFR BLD: 67 % (ref 32–75)
NRBC # BLD: 0 K/UL (ref 0–0.01)
NRBC BLD-RTO: 0 PER 100 WBC
PLATELET # BLD AUTO: 225 K/UL (ref 150–400)
PMV BLD AUTO: 9.3 FL (ref 8.9–12.9)
POTASSIUM SERPL-SCNC: 3.9 MMOL/L (ref 3.5–5.1)
PROT SERPL-MCNC: 6.6 G/DL (ref 6.4–8.2)
RBC # BLD AUTO: 4.36 M/UL (ref 3.8–5.2)
SODIUM SERPL-SCNC: 137 MMOL/L (ref 136–145)
WBC # BLD AUTO: 9.6 K/UL (ref 3.6–11)

## 2022-12-25 PROCEDURE — 85025 COMPLETE CBC W/AUTO DIFF WBC: CPT

## 2022-12-25 PROCEDURE — 74011250637 HC RX REV CODE- 250/637

## 2022-12-25 PROCEDURE — 36415 COLL VENOUS BLD VENIPUNCTURE: CPT

## 2022-12-25 PROCEDURE — 80053 COMPREHEN METABOLIC PANEL: CPT

## 2022-12-25 PROCEDURE — 65270000029 HC RM PRIVATE

## 2022-12-25 PROCEDURE — 75410000003 HC RECOV DEL/VAG/CSECN EA 0.5 HR: Performed by: OBSTETRICS & GYNECOLOGY

## 2022-12-25 PROCEDURE — 75410000002 HC LABOR FEE PER 1 HR: Performed by: OBSTETRICS & GYNECOLOGY

## 2022-12-25 PROCEDURE — 0KQM0ZZ REPAIR PERINEUM MUSCLE, OPEN APPROACH: ICD-10-PCS | Performed by: FAMILY MEDICINE

## 2022-12-25 PROCEDURE — 75410000000 HC DELIVERY VAGINAL/SINGLE: Performed by: OBSTETRICS & GYNECOLOGY

## 2022-12-25 PROCEDURE — 74011250636 HC RX REV CODE- 250/636

## 2022-12-25 PROCEDURE — 65410000002 HC RM PRIVATE OB

## 2022-12-25 PROCEDURE — 4A1HXCZ MONITORING OF PRODUCTS OF CONCEPTION, CARDIAC RATE, EXTERNAL APPROACH: ICD-10-PCS | Performed by: FAMILY MEDICINE

## 2022-12-25 PROCEDURE — 75810000275 HC EMERGENCY DEPT VISIT NO LEVEL OF CARE

## 2022-12-25 PROCEDURE — 74011000250 HC RX REV CODE- 250

## 2022-12-25 PROCEDURE — 84112 EVAL AMNIOTIC FLUID PROTEIN: CPT | Performed by: FAMILY MEDICINE

## 2022-12-25 RX ORDER — IBUPROFEN 800 MG/1
800 TABLET ORAL EVERY 8 HOURS
Status: DISCONTINUED | OUTPATIENT
Start: 2022-12-25 | End: 2022-12-27 | Stop reason: HOSPADM

## 2022-12-25 RX ORDER — NALOXONE HYDROCHLORIDE 0.4 MG/ML
0.4 INJECTION, SOLUTION INTRAMUSCULAR; INTRAVENOUS; SUBCUTANEOUS AS NEEDED
Status: DISCONTINUED | OUTPATIENT
Start: 2022-12-25 | End: 2022-12-26 | Stop reason: HOSPADM

## 2022-12-25 RX ORDER — SODIUM CHLORIDE 0.9 % (FLUSH) 0.9 %
5-40 SYRINGE (ML) INJECTION AS NEEDED
Status: DISCONTINUED | OUTPATIENT
Start: 2022-12-25 | End: 2022-12-27 | Stop reason: HOSPADM

## 2022-12-25 RX ORDER — SODIUM CHLORIDE, SODIUM LACTATE, POTASSIUM CHLORIDE, CALCIUM CHLORIDE 600; 310; 30; 20 MG/100ML; MG/100ML; MG/100ML; MG/100ML
125 INJECTION, SOLUTION INTRAVENOUS CONTINUOUS
Status: DISCONTINUED | OUTPATIENT
Start: 2022-12-25 | End: 2022-12-27

## 2022-12-25 RX ORDER — LIDOCAINE HYDROCHLORIDE 10 MG/ML
INJECTION INFILTRATION; PERINEURAL
Status: COMPLETED
Start: 2022-12-25 | End: 2022-12-25

## 2022-12-25 RX ORDER — SODIUM CHLORIDE 0.9 % (FLUSH) 0.9 %
5-40 SYRINGE (ML) INJECTION EVERY 8 HOURS
Status: DISCONTINUED | OUTPATIENT
Start: 2022-12-25 | End: 2022-12-27 | Stop reason: HOSPADM

## 2022-12-25 RX ORDER — OXYTOCIN/RINGER'S LACTATE 30/500 ML
10 PLASTIC BAG, INJECTION (ML) INTRAVENOUS AS NEEDED
Status: DISCONTINUED | OUTPATIENT
Start: 2022-12-25 | End: 2022-12-27 | Stop reason: HOSPADM

## 2022-12-25 RX ORDER — OXYTOCIN/RINGER'S LACTATE 30/500 ML
10 PLASTIC BAG, INJECTION (ML) INTRAVENOUS AS NEEDED
Status: COMPLETED | OUTPATIENT
Start: 2022-12-25 | End: 2022-12-25

## 2022-12-25 RX ORDER — OXYTOCIN/RINGER'S LACTATE 30/500 ML
87.3 PLASTIC BAG, INJECTION (ML) INTRAVENOUS AS NEEDED
Status: DISCONTINUED | OUTPATIENT
Start: 2022-12-25 | End: 2022-12-27 | Stop reason: HOSPADM

## 2022-12-25 RX ORDER — NALOXONE HYDROCHLORIDE 0.4 MG/ML
0.4 INJECTION, SOLUTION INTRAMUSCULAR; INTRAVENOUS; SUBCUTANEOUS AS NEEDED
Status: DISCONTINUED | OUTPATIENT
Start: 2022-12-25 | End: 2022-12-27 | Stop reason: HOSPADM

## 2022-12-25 RX ADMIN — OXYTOCIN 30000 MILLI-UNITS: 10 INJECTION, SOLUTION INTRAMUSCULAR; INTRAVENOUS at 20:01

## 2022-12-25 RX ADMIN — IBUPROFEN 800 MG: 800 TABLET, FILM COATED ORAL at 21:06

## 2022-12-25 RX ADMIN — LIDOCAINE HYDROCHLORIDE: 10 INJECTION, SOLUTION INFILTRATION; PERINEURAL at 20:03
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
## 2022-12-25 NOTE — PROGRESS NOTES
1730 Patient arrived to unit via w/c transport accompanied by significant other, states her water broke at home around 4pm. EFM applied. [address] used for communication. Oriented to room. States her water broke around 4pm at home, clear fluid of moderate amount. Denies vaginal bleeding. Amnisure performed. 1800 Amnisure +. Md notified of results. Family practice resident to bedside, US performed to verify fetal position. 1815 Denies H/A, visual disturbances, RUQ pain, N/V.      1840 Dr. Zhane Marti to bedside, patient consented to [address].  services used for communication. POC reviewed. Opportunity provided to ask questions. 1900 Bedside shift change report given to [address] (oncoming nurse) by Aidan Lentz RN (offgoing nurse). Report included the following information SBAR, Kardex, MAR, and Recent Results.

## 2022-12-26 PROCEDURE — 99024 POSTOP FOLLOW-UP VISIT: CPT | Performed by: FAMILY MEDICINE

## 2022-12-26 PROCEDURE — 74011250637 HC RX REV CODE- 250/637

## 2022-12-26 PROCEDURE — 65270000029 HC RM PRIVATE

## 2022-12-26 RX ORDER — POLYETHYLENE GLYCOL 3350 17 G/17G
17 POWDER, FOR SOLUTION ORAL DAILY
Qty: 20 PACKET | Refills: 0 | Status: SHIPPED | OUTPATIENT
Start: 2022-12-26

## 2022-12-26 RX ORDER — IBUPROFEN 800 MG/1
800 TABLET ORAL EVERY 8 HOURS
Qty: 30 TABLET | Refills: 0 | Status: SHIPPED | OUTPATIENT
Start: 2022-12-26

## 2022-12-26 RX ADMIN — IBUPROFEN 800 MG: 800 TABLET, FILM COATED ORAL at 22:07

## 2022-12-26 RX ADMIN — IBUPROFEN 800 MG: 800 TABLET, FILM COATED ORAL at 14:25

## 2022-12-26 RX ADMIN — IBUPROFEN 800 MG: 800 TABLET, FILM COATED ORAL at 06:30

## 2022-12-26 NOTE — DISCHARGE INSTRUCTIONS
*Por favor nicky zoey andrew con gregorio cardiólogo para gregorio presión arterial baja. El período posparto: Instrucciones de cuidado-Instrucciones de cuidado  Postpartum: Care Instructions  Instrucciones de cuidado  Después del parto (período posparto), gregorio cuerpo pasa por muchos cambios. Algunos de estos cambios suceden priscilla varias semanas. [address], el cuerpo comienza a recuperarse y se prepara para el amamantamiento. Es posible que [address] se sienta sensible. Las hormonas pueden cambiar gregorio estado de ánimo sin advertencia y sin motivo aparente. Priscilla las primeras [address] después del parto, muchas mujeres tienen emociones que [place] de jose a jesús. Es posible que le resulte difícil dormir. Es posible que llore mucho. [place] se llama \"melancolía de la maternidad\". Estas emociones abrumadoras suelen desaparecer dentro de unos días o semanas. [place] es importante hablar con gregorio médico acerca de roc sentimientos. Priscilla las primeras semanas después del [place], es fácil cansarse demasiado o sentirse [place]. No nicky demasiados esfuerzos. Descanse cada vez que pueda, acepte la ayuda de los demás, coma anegl y kenyon abundantes líquidos. En el primer par de [employer] de nancy a artie, es posible que gregorio médico o partera deseen verla y hacer un plan para cualquier atención de seguimiento que usted pueda necesitar. Probablemente tendrá Idalmis Fret andrew completa de posparto dentro de los primeros 3 meses después del [place]ce[place]. En mina momento, gregorio médico o partera revisarán gregorio recuperación del parto. Él o larry también verán cómo está enfrentando usted roc emociones y conversarán sobre roc inquietudes y preguntas. La atención de seguimiento es zoey parte clave de gregorio tratamiento y seguridad. Asegúrese de hacer y acudir a todas las citas, y llame a gregorio médico si está teniendo problemas. También es zoey buena idea saber los resultados de roc exámenes y mantener zoey lista de los medicamentos que florencia.   ¿Cómo puede cuidarse en el hogar? [place] o descanse cuando gregorio bebé lo nicky. Si es posible, permita que amisha familiares o amisha amigos la ayuden con las tareas del hogar. No intente hacerlo todo usted peg. Si tiene hemorroides o hinchazón o dolor alrededor de la abertura de la vagina, pruebe aplicarse frío y calor. Puede aplicarse hielo o zoey compresa fría en la simon priscilla 10 a 20 minutos cada vez. Póngase un paño ceron entre el hielo y la piel. Además, trate de sentarse en algunos centímetros de agua tibia (baño de asiento) 3 veces al día y después de las evacuaciones. [place] los analgésicos (medicamentos para el dolor) exactamente según las indicaciones. Si el médico le recetó un analgésico, tómelo según las indicaciones. Si no está tomando un analgésico recetado, pregúntele a gregorio médico si puede lily juanito de [employer]. Coma más fibra para evitar el estreñimiento. Incluya alimentos ailyn panes y cereales integrales, verduras crudas, frutas crudas y secas, y frijoles (habichuelas). Suad mucho líquido. Si tiene zoey enfermedad renal, cardíaca o hepática y tiene que restringir los líquidos, hable con gregorio médico antes de aumentar la cantidad de líquido que patricia. No se lave el interior de la vagina con líquidos (lavados vaginales). Si tiene puntos de sutura, mantenga la simon limpia con agua tibia, ya sea echándola o rociándola sobre la simon externa de la vagina y el ano después de usar el baño. Lleve zoey lista de preguntas para hacerle a gregorio médico o partera. Amisha preguntas podrían ser acerca de lo siguiente:  Cambios en los senos, ailyn bultos o sensibilidad. Cuándo esperar que regrese gregorio período menstrual.  Qué forma de anticoncepción es la más adecuada para usted. El peso que aumentó priscilla el [facility]. Las opciones de [address]. Malva Dirk y bebidas son mejores para usted, sobre todo si está amamantando. Problemas que podría tener con la lactancia. Cuándo puede [employer].  Anna Leaver tony vez quieran hablar sobre lubricantes vaginales. Cualquier sentimiento de tristeza o inquietud que tenga. ¿Cuándo debe pedir ayuda? Llame al 911  en cualquier momento que considere que necesita atención de [place]. Por ejemplo, llame si:    Tiene pensamientos de lastimarse o de hacerle daño a gregorio bebé o a otra persona. Se desmayó (perdió el conocimiento). Tiene dolor en el pecho, le falta el aire o tose [place]. Tiene convulsiones. Llame a gregorio médico ahora mismo o busque atención médica de inmediato si:    Gregorio sangrado vaginal parece hacerse más abundante. Se siente mareada o aturdida, o que está a punto de [place]. Tiene fiebre. Tiene dolor abdominal nuevo o más intenso. Tiene síntomas de un coágulo de stewart en la pierna (que se llama trombosis venosa profunda), ailyn:  Dolor en la pantorrilla, el muslo, la viktoria o detrás de la rodilla. Enrojecimiento e hinchazón en la pierna o la viktoria. Tiene señales de preeclampsia, ailyn:  Hinchazón repentina de la arleth, las iris o los pies. Nuevos problemas de la vista (ailyn oscurecimiento, ca borroso o ca puntos). Dolor de jonathon intenso. Preste especial atención a los cambios en gregorio lyndsey y asegúrese de comunicarse con gregorio médico si:    Tiene nuevo flujo vaginal o mony empeora. Se siente jesús o deprimida. Tiene problemas con los senos o el amamantamiento. ¿Dónde puede encontrar más información en inglés? Vaya a [email or web address]  Rupert [id number] en la búsqueda para aprender más acerca de \"El período posparto: Instrucciones de cuidado-Instrucciones de cuidado. \"  Revisado: 23 febrero, 2022               Versión del contenido: 13.4  © 4078-1183 [employer], [facility]. Las instrucciones de cuidado fueron adaptadas bajo licencia por [employer] (which disclaims liability or warranty for this information).  Si usted tiene [place] afección médica o sobre estas instrucciones, siempre pregunte a gregorio profesional de lyndsey. Healthwise, Incorporated niega toda garantía o responsabilidad por gregorio uso de esta información. Por favor, justin la vacuna contra la varicela. Después del parto (período de posparto): Instrucciones de cuidado  After Your Delivery (the Postpartum Period): Care Instructions  Instrucciones de cuidado     Felicidades por el nacimiento de gregorio bebé. Al igual que el Edy, el tiempo con el recién nacido puede ser un momento de [place], Eileen [place] y agotamiento. Es posible que se sienta jose al mirar la osmin de gregorio pequeño bebé. También podría sentirse abrumada por gregorio nuevo ritmo de sueño y las nuevas responsabilidades. Al principio, los bebés suelen dormir christian el día y permanecen despiertos christian la noche. No tienen ningún patrón ni rutina. Podrían nancy gritos ahogados, sacudirse y despertarse, o parecer ailyn si tuvieran los ojos cruzados (bizcos). Todo esto es normal, e incluso la pueden hacer sonreír. Christian las primeras semanas siguientes al parto, trate de cuidarse angel. Podría tardar de 4 a 6 semanas en volver a sentirse usted misma, y posiblemente más tiempo si le dimas hecho zoey cesárea. Es probable que se sienta muy fatigada christian varias semanas. Amisha días estarán llenos de [place], alida también de mucha alegría. La atención de seguimiento es zoey parte clave de gregorio tratamiento y seguridad. Asegúrese de hacer y acudir a todas las citas, y llame a gregorio médico si está teniendo problemas. También es zoey buena idea saber los resultados de amisha exámenes y mantener zoey lista de los medicamentos que florencia. ¿Cómo puede cuidarse en el hogar? Cuide gregorio cuerpo después del parto  Utilice toallas sanitarias en vez de tampones para las pérdidas de [place] que podrían durar hasta 2 semanas. Alivie los cólicos con ibuprofeno (Advil, Motrin).   Alivie el dolor de las hemorroides y la simon entre la vagina y el recto con compresas de hielo o de infusión de hamamelis Tommy Georges (\"witch hazel\"). Alivie el estreñimiento bebiendo mucho líquido y comiendo alimentos ricos en fibra. Pregúntele a gregorio médico acerca de los ablandadores de heces de [place]. Límpiese con un chorrito suave de agua tibia de zoey botella en vez de hacerlo con papel higiénico.  [place] un baño de asiento en agua tibia varias veces al día. Use un buen sostén de lactancia. Alivie el dolor y la hinchazón de los senos con toallitas de aseo húmedas tibias. Si no está amamantando, use hielo en vez de calor para la sensibilidad en los senos. Si está amamantando, gregorio período menstrual podría no comenzar hasta después de varios meses. Es posible que, al principio, stewart más y [place] de lo que lo hacía antes del Kyle Morn. Espere a que haya sanado (alrededor de 4 a 6 semanas) antes de tener relaciones sexuales. Pregúntele a gregorio médico cuándo está angel que tenga [employer]. Trate de no viajar con el bebé priscilla las primeras 5 o 6 semanas. Si hace un viaje marlena en automóvil, nicky paradas frecuentes para caminar y estirarse. Evite el agotamiento  Descanse todos los días. Trate de dormir la siesta cuando gregorio bebé también lo nicky. [place] a otro adulto que la acompañe por unos paul después del [place]. Planifique el cuidado de los niños si tiene otros hijos. Sea flexible para que pueda comer a horas fuera de lo común y dormir cuando lo necesite. Tanto usted ailyn gregorio bebé están creando horarios nuevos. Planee pequeñas salidas para estar fuera de casa. El cambio podría hacer que se sienta menos fatigada. Pida ayuda para cocinar y [address] hogar y las compras. Recuerde que gregorio principal tarea consiste en cuidar a gregorio bebé. Sepa qué ayuda puede recibir en jaleel de tener depresión posparto  La \"melancolía de la maternidad\" es común priscilla las primeras 1 a 2 semanas después del [place]. Usted podría llorar o sentirse jesús o irritable sin motivo. Descanse cada vez que pueda hacerlo.  Valery Tsang dificulta manejar las emociones. Salga a caminar con gregorio bebé. Hable con gregorio demario, roc amigos y roc familiares acerca de roc sentimientos. Si roc síntomas hoyt más de [address], o si se siente muy deprimida, pídale ayuda a gregorio médico.  La depresión posparto puede tratarse. Los grupos de apoyo y la asesoría psicológica pueden ser [place]e [place]. A veces, los medicamentos también pueden ayudar. Manténgase saludable  Coma alimentos saludables para tener más energía. Si amamanta, evite las drogas. Si dejó de fumar priscilla el embarazo, trate de no volver a fumar. Si opta por lily zoey bebida alcohólica de vez en cuando, solo tome zoey bebida y limite la cantidad de ocasiones en que kenyon alcohol. Después de beber alcohol, espere al menos 2 horas antes de amamantar para reducir la cantidad de alcohol que el bebé pueda recibir a través de la [place]. Comience a hacer ejercicios diarios después de 4 a 6 semanas, alida descanse cuando se sienta fatigada. Aprenda ejercicios para tonificar el abdomen. Pruebe los ejercicios de [place] para recuperar la fuerza en los músculos pélvicos. Usted puede hacer estos ejercicios mientras está de pie o en posición sentada. (Si hacer estos ejercicios causa dolor, deje de hacerlos y hable con gregorio médico). Contraiga los músculos ailyn si estuviera tratando de no ventosear. O contraiga los músculos ailyn si estuviera interrumpiendo un chorro de [place]. Debe tener inmóviles el abdomen, las piernas y las nalgas. Mantenga la contracción por 3 segundos, y luego relaje por entre 5 y 8 segundos. Comience con 3 segundos, luego añada 1 coretta cada semana hasta que pueda contraer por 10 segundos. Repita el ejercicio 10 veces por sesión. Alonzo de 3 a 8 sesiones al día. Encuentre zoey clase para usted y gregorio bebé que tenga un tiempo de ejercicio. Si le dimas hecho zoey cesárea, dese un poco más de tiempo antes de hacer ejercicio, y tenga cuidado. ¿Cuándo debe pedir ayuda?    Llame al 911  en cualquier momento que considere que necesita atención de [place]. Por ejemplo, llame si:    Tiene pensamientos de lastimarse o de hacerle daño a gregorio bebé o a otra persona. Se desmayó (perdió el conocimiento). Tiene dolor en el pecho, le falta el aire o tose [place]. Tiene convulsiones. Llame a gregorio médico ahora mismo o busque atención médica de inmediato si:    Tiene sangrado vaginal intenso. [place] significa que está expulsando coágulos sanguíneos y empapando zoey toalla sanitaria cada hora por 2 horas o más. Está mareada o aturdida, o siente ailyn que se puede desmayar. Tiene fiebre. Tiene dolor abdominal nuevo o más intenso. Tiene señales de un coágulo de stewart en la pierna (que se llama trombosis venosa profunda), ailyn:  Dolor en la pantorrilla, el muslo, la viktoria o detrás de la rodilla. Enrojecimiento e hinchazón en la pierna o la viktoria. Tiene señales de preeclampsia, ailyn:  Hinchazón repentina de la arleth, las iris o los pies. Nuevos problemas de la vista (ailyn oscurecimiento, ca borroso o ca puntos). Dolor de jonathon intenso. Preste especial atención a los cambios en gregorio lyndsey y asegúrese de comunicarse con gregorio médico si:    Gregorio sangrado vaginal parece volverse más intenso. Tiene flujo vaginal nuevo o que empeora. Se siente jesús, ansiosa o sin esperanzas priscilla más de unos pocos días. No mejora ailyn se esperaba. ¿Dónde puede encontrar más información en inglés? Kelly Chao a [email or web address]/  Rupert [id number] en la búsqueda para aprender más acerca de \"Después del parto (período de posparto): Instrucciones de cuidado. \"  Revisado: 23 febrero, 2022               Versión del contenido: 13.4  © 4035-9040 [employer]. Las instrucciones de cuidado fueron adaptadas bajo licencia por [facility] Connections (which disclaims liability or warranty for this information).  Si usted tiene [place] afección médica o sobre estas instrucciones, siempre pregunte a gregorio profesional de lyndsey. [employer] niega toda garantía o responsabilidad por gregorio uso de esta información.

## 2022-12-26 NOTE — LACTATION NOTE
This note was copied from a baby's chart. Mother states that she is breast and bottle feeding her baby. She chooses to bottle feed at this time and stated that she breast fed baby well at 6 this morning. This is her 3rd baby and she states that she plans to continue breast and bottle after her milk comes in. [facility] asked mother if she was interested in a hand pump and she said no. LC expressed the importance of feeding baby on demand or ensuring 8-12 feedings at the breast in a 24 hour period. LC recognized mother's paced bottle feeding and encouraged her continue that. Mother states that she will call for the next feeding. Breastfeeding booklet provided in [address] [facility] Reviewed breastfeeding basics:  How milk is made and normal  breastfeeding behaviors discussed. Supply and demand,  stomach size, early feeding cues, skin to skin bonding with comfortable positioning and baby led latch-on reviewed. How to identify signs of successful breastfeeding sessions reviewed; education on asymetrical latch, signs of effective latching vs shallow, in-effective latching, normal  feeding frequency and duration and expected infant output discussed. Normal course of breastfeeding discussed including the AAP's recommendation that children receive exclusive breast milk feedings for the first six months of life with breast milk feedings to continue through the first year of life and/or beyond as complimentary table foods are added. Breastfeeding Booklet and Warm line information provided with discussion. Discussed typical  weight loss and the importance of pediatrician appointment within 24-48 hours of discharge, at 2 weeks of life and normalcy of requesting pediatric weight checks as needed in between visits. Discussed with mother her plan for feeding. Reviewed the benefits of exclusive breast milk feeding during the hospital stay.    Informed her of the risks of using formula to supplement in the first few days of life as well as the benefits of successful breast milk feeding; referred her to the Breastfeeding booklet about this information. She acknowledges understanding of information reviewed and states that it is her plan to breast and bottle feed her infant. Will support her choice and offer additional information as needed. Pt will successfully establish breastfeeding by feeding in response to early feeding cues   or wake every 3h, will obtain deep latch, and will keep log of feedings/output. Taught to BF at hunger cues and or q 2-3 hrs and to offer 10-20 drops of hand expressed colostrum at any non-feeds. Breast Assessment  Left Breast:  (did not assess)  Breast- Feeding Assessment  Attends Breast-Feeding Classes: No  Breast-Feeding Experience: Yes (3rd baby to breastfeed)  Breast Trauma/Surgery: No  Type/Quality: Good (per mother)  Lactation Consultant Visits  Breast-Feedings:  (mother chose to bottle feed with this feeding)     LATCH Documentation  Latch:  (pt to call LC for next latch)  Audible Swallowing: A few with stimulation  Type of Nipple: Everted (after stimulation)  Comfort (Breast/Nipple): Soft/non-tender  Hold (Positioning): No assist from staff, mother able to position/hold infant  LATCH Score: 8    Pt chooses to do both breast and bottle. Discussed effects of early supplementation on breastfeeding success; may decrease breastmilk production and supply, increase risk for pathological engorgement, baby may develop preference for faster flow from bottles vs breast, and baby's stomach can be stretched if larger volumes of formula are given.

## 2022-12-26 NOTE — PROGRESS NOTES
Patient transferred to MIU with Phong Kunz RN and [facility] PCT. Bands verified, patient oriented to her room,  screen placed in patient's room.

## 2022-12-26 NOTE — L&D DELIVERY NOTE
Delivery Summary    Patient: Ace Quezada MRN: [id number]  SSN: [id number]    YOB: 2000  Age: 20 y.o. Sex: female         Information for the patient's :  Nicol Larsne, Female Hayes Gitelman [[id number]]     Labor Events:    Labor: No    Steroids: None   Cervical Ripening Date/Time:       Cervical Ripening Type: None   Antibiotics During Labor: No   Rupture Identifier:      Rupture Date/Time: 2022 4:00 PM   Rupture Type: SROM   Amniotic Fluid Volume: Moderate    Amniotic Fluid Description: Clear    Amniotic Fluid Odor:      Induction: None       Induction Date/Time:        Indications for Induction:      Augmentation: None   Augmentation Date/Time:      Indications for Augmentation:     Labor complications: None       Additional complications:        Delivery Events:  Indications For Episiotomy:     Episiotomy: None   Perineal Laceration(s):     Repaired:     Periurethral Laceration Location:      Repaired:     Labial Laceration Location:     Repaired:     Sulcal Laceration Location:     Repaired:     Vaginal Laceration Location:     Repaired:     Cervical Laceration Location:     Repaired:     Repair Suture: Vicryl 3-0   Number of Repair Packets: 1   Estimated Blood Loss (ml):  ml   Quantitative Blood Loss (ml)                Delivery Date: 2022    Delivery Time: 7:57 PM  Delivery Type: Vaginal, Spontaneous  Sex:  Female    Gestational Age: 37w0d   Delivery Clinician:  Katelyn Jaeger  Living Status: Living   Delivery Location: L&D 204          APGARS  One minute Five minutes Ten minutes   Skin color:            Heart rate:            Grimace:            Muscle tone:            Breathing: Totals:                Presentation: Vertex    Position:   Occiput    Resuscitation Method:  Suctioning-bulb; Tactile Stimulation     Meconium Stained: None      Cord Information: 3 Vessels  Complications: None  Cord around:    Delayed cord clamping? Yes  Cord clamped date/time:2022  7:58 PM  Disposition of Cord Blood: Lab    Blood Gases Sent?: No    Placenta:  Date/Time: 2022  8:02 PM  Removal: Expressed      Appearance: Normal      Measurements:  Birth Weight:        Birth Length:        Head Circumference:        Chest Circumference:       Abdominal Girth: Other Providers:   Belen DOLAN;LISETTE CARRINGTON;TRACI ANNE;ROBERTA CLEMONS;ROBB BRAR;PALMIRA NUGENT, Obstetrician;Primary Nurse;Primary  Nurse;Charge Nurse;Resident; Resident       Group B Strep: No results found for: MARIO Wilkinson  Information for the patient's :  Imtiaz Aparicio, Female Kyesha Link [[id number]]   No results found for: ABORH, PCTABR, PCTDIG, BILI, ABORHEXT, ABORH   No results for input(s): PCO2CB, PO2CB, HCO3I, SO2I, IBD, PTEMPI, SPECTI, PHICB, ISITE, IDEV, IALLEN in the last 72 hours.

## 2022-12-26 NOTE — PROGRESS NOTES
1900: Bedside and Verbal shift change report given to [address] [place] [address] Avenue (oncoming nurse) by Hugo Diamond RN (offgoing nurse). Report included the following information SBAR, Kardex, Intake/Output, MAR, and Recent Results. 1925: Pt bolusing for epidural.     1950: SVE by this RN and Gonzalo Valente. Complete. 1956: Patient actively pushing. RN remains in continuous attendance at the bedside. Assessment & evaluation of fetal heart rate ongoing via continuous EFM. 1957: RN remained at bedside throughout pushing. EFM continuously assessed. Vaginal delivery of viable female infant. Delivery QBL: 200 ml.     1945: 2 hr QBL: 120 ml. Total QBL: 320 ml.     0210: SBAR to TXU Miki.

## 2022-12-26 NOTE — PROGRESS NOTES
0400 Small trickle noted when assessing fundus. Patient voided bladder, which ended trickle. Fundus firm throughout. Advised patient to try to void her bladder every two to three hours, and why it is important. Patient verbalized understanding. 0630 Patient stated she had voided, and her bleeding was light.

## 2022-12-26 NOTE — PROGRESS NOTES
[address] [place] [address]   Office [phone], Fax [phone]      Post-Partum Day 1 Progress Note    Patient doing well post-partum without significant complaint. Pain well controlled. Lochia minimal. Tolerating regular diet. Ambulating. Voiding without difficulty, passing flatus. No BM yet. Vitals:  Patient Vitals for the past 8 hrs:   BP Temp Pulse Resp SpO2   22 1106 (!) 96/51 98 °F (36.7 °C) 91 16 98 %   22 0830 (!) 103/59 97.9 °F (36.6 °C) 81 16 99 %     Temp (24hrs), Av.2 °F (36.8 °C), Min:97.9 °F (36.6 °C), Max:98.9 °F (37.2 °C)      Vital signs stable, afebrile. Exam:  Patient without distress. CTAB, no w/r/r/c               RRR, + S1 and S2, no m/r/g               Abdomen soft, fundus firm at level of umbilicus, non tender               Lower extremities negative for swelling, cords, tenderness. Lab/Data Review:  No results found for this or any previous visit (from the past 12 hour(s)). Assessment and Plan:      Enrique Pompa is a 20 y.o.  s/p  at 37w0d. Pregnancy was complicated by VZV nonimmune status, GBS unknown. Patient appears to be having uncomplicated post-partum course. Continue routine perineal care and maternal education. Hypotension: patient has h/o hypotension and was on florinef prior to pregnancy. Had recent nl echo w cardiology. Has had 2 MAPs of 64, asx. Can consider fluid bolus if needed. Patient to follow up with cardiology OP. VZV vaccine OP, not on formulary here. Anticipate discharge tomorrow if no problems occur. Patient will be discussed with Dr. Pratik Villanueva.     Luciatrisha Yen MD  Family Medicine Resident

## 2022-12-26 NOTE — DISCHARGE SUMMARY
Obstetrical Discharge Summary     Name: Farrukh Peter MRN: [id number]  SSN: [id number]    YOB: 2000  Age: 20 y.o. Sex: female      Admit Date: 2022    Discharge Date: 2022     Admitting Physician: Cierra Rios DO     Attending Physician:  Genesis Peacock DO     Admission Diagnoses: Labor and delivery, indication for care [O75.9]    Discharge Diagnoses:   Information for the patient's :  Shannan [place], Female Curtis Chao [[id number]]   Delivery of a 2.64 kg female infant via Vaginal, Spontaneous on 2022 at 7:57 PM  by Lillian Meza. Apgars were 9  and 9 . Additional Diagnoses:   Hospital Problems  Date Reviewed: 2022            Codes Class Noted POA    Labor and delivery, indication for care ICD-10-CM: O75.9  ICD-9-CM: 659.90  2022 Unknown          Lab Results   Component Value Date/Time    Rubella, External immune 2022 12:00 AM       Immunization(s):   Immunization History   Administered Date(s) Administered    Tdap 2022        Hospital Course:   Patient is a 20 y.o.  s/p  at 37w0d. Presented for  SROM and contractions. Pregnancy complicated by prior  labor at 33w (possibly d/t Chikungunya), and fetal demise at 10 DOL. Labor was uncomplicated. Delivered TLFI by  with no complications other than 2nd degree lac;  Normal hospital course following the delivery. On day of discharge patient reported minimal lochia, well controlled pain, and no other complaints. Discharged with pain regimen and bowel regimen. Advised to continue prenatal vitamins. Follow up with Dr. Bernida Kanner in 6 weeks. Patient to get VZV vaccine outpatient. Patient to follow-up with cardiologist outpatient for hypotension.  Depression Scale  EPDS score 3 on .     Follow up test at discharge: None  Follow up vaccinations at discharge: VZV  Condition at Discharge:  Stable  Disposition: Discharge to Home    Physical exam:  Visit Vitals  /65 (BP Patient Position: Sitting)   Pulse 79   Temp 98.5 °F (36.9 °C)   Resp 16   Wt 49.9 kg (110 lb)   LMP 2022   SpO2 98%   Breastfeeding Unknown   BMI 20.78 kg/m²       Exam:  Patient without distress. CTAB, no w/r/r/c               RRR, + S1 and S2, no m/r/g               Abdomen soft, fundus firm at level of umbilicus, non tender               Lower extremities are negative for swelling, cords or tenderness. Patient Instructions:   Current Discharge Medication List        START taking these medications    Details   ibuprofen (MOTRIN) 800 mg tablet Take 1 Tablet by mouth every eight (8) hours. Qty: 30 Tablet, Refills: 0  Start date: 2022      polyethylene glycol (MIRALAX) 17 gram packet Take 1 Packet by mouth daily. Qty: 20 Packet, Refills: 0  Start date: 2022           CONTINUE these medications which have NOT CHANGED    Details   prenatal vit-iron fumarate-fa 27 mg iron- 0.8 mg tab tablet Take 1 Tablet by mouth daily. Qty: 90 Tablet, Refills: 3    Associated Diagnoses: Encounter for supervision of normal pregnancy, antepartum, unspecified                Reference my discharge instructions.     Future Appointments   Date Time Provider Darryl Estrada   2023  8:30 AM MD CRISTIAN Mcduffie BS [facility]   5/15/2023  2:00 PM MD SAL Barnett     Patient needs VZV vaccine outpatient      Signed By:  Gaetano Goltz, MD    Family Medicine Resident

## 2022-12-27 VITALS
WEIGHT: 110 LBS | HEART RATE: 79 BPM | SYSTOLIC BLOOD PRESSURE: 101 MMHG | BODY MASS INDEX: 20.78 KG/M2 | TEMPERATURE: 98.5 F | DIASTOLIC BLOOD PRESSURE: 65 MMHG | OXYGEN SATURATION: 98 % | RESPIRATION RATE: 16 BRPM

## 2022-12-27 PROCEDURE — 99238 HOSP IP/OBS DSCHRG MGMT 30/<: CPT | Performed by: FAMILY MEDICINE

## 2022-12-27 PROCEDURE — 74011250637 HC RX REV CODE- 250/637

## 2022-12-27 RX ADMIN — IBUPROFEN 800 MG: 800 TABLET, FILM COATED ORAL at 05:58

## 2022-12-27 NOTE — ROUTINE PROCESS
Bedside and Verbal shift change report given to Madhuri Szymanski (oncoming nurse) by Eric Mendoza (offgoing nurse). Report given with SBAR, Kardex, Intake/Output and MAR.

## 2022-12-27 NOTE — PROGRESS NOTES
1100 Patient signed hard copy of discharge instructions due to electronic signature pad not working. 36 Pt off unit in stable condition via wheelchair with volunteers for discharge home per Dr. Lennox Pouch. Pt is aware to follow-up 2/7/23. Prescriptions sent to pharmacy. Pt denies any HA, dizziness, N/V or pain at this time. Infant in car seat and discharged with mother.

## 2023-02-13 ENCOUNTER — OFFICE VISIT (OUTPATIENT)
Dept: FAMILY MEDICINE CLINIC | Age: 23
End: 2023-02-13
Payer: MEDICAID

## 2023-02-13 VITALS
DIASTOLIC BLOOD PRESSURE: 62 MMHG | WEIGHT: 108 LBS | OXYGEN SATURATION: 100 % | BODY MASS INDEX: 20.39 KG/M2 | TEMPERATURE: 97.9 F | SYSTOLIC BLOOD PRESSURE: 97 MMHG | HEIGHT: 61 IN | RESPIRATION RATE: 16 BRPM | HEART RATE: 72 BPM

## 2023-02-13 PROCEDURE — 0503F POSTPARTUM CARE VISIT: CPT | Performed by: STUDENT IN AN ORGANIZED HEALTH CARE EDUCATION/TRAINING PROGRAM
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
## 2023-02-13 NOTE — PROGRESS NOTES
Isma Pineda is a 20 y.o. female    Chief Complaint   Patient presents with    Vaginal Bleeding     Patient is coming in for a post-partum follow up. She has been having bleeding and headaches. She had her baby 12/25/2022. She states that it had gone away the end of January and it came back on. No other concerns. 1. Have you been to the ER, urgent care clinic since your last visit? Hospitalized since your last visit? No    2. Have you seen or consulted any other health care providers outside of the [address] [place] [address] since your last visit? Include any pap smears or colon screening. No      Visit Vitals  BP 97/62 (BP 1 Location: Right upper arm, BP Patient Position: Sitting)   Pulse 72   Temp 97.9 °F (36.6 °C) (Oral)   Resp 16   Ht 5' 1\" (1.549 m)   Wt 108 lb (49 kg)   SpO2 100%   BMI 20.41 kg/m²           Health Maintenance Due   Topic Date Due    COVID-19 Vaccine (1) Never done    HPV Age 9Y-34Y (1 - 2-dose series) Never done    Flu Vaccine (1) Never done         Medication Reconciliation completed, changes noted.   Please  Update medication list.

## 2023-03-08 ENCOUNTER — TELEPHONE (OUTPATIENT)
Dept: FAMILY MEDICINE CLINIC | Age: 23
End: 2023-03-08

## 2023-03-08 NOTE — TELEPHONE ENCOUNTER
I called the patient to inform her that the pharmacy was trying to get in contact with her in regards to the Nexplanon implant, but she did not answer the phone. So I told her to keep on a look out because they are going to try one more time. I also gave her the phone number to call just in case she misses the call. Phone number that was given was [phone]. I told her if she does not give consent they will not ship out the Nexplanon implant. I also told her I will call her once we get the implant if she gives consent to schedule her procedure appointment. Patient is aware and understands.

## 2023-03-17 ENCOUNTER — OFFICE VISIT (OUTPATIENT)
Dept: FAMILY MEDICINE CLINIC | Age: 23
End: 2023-03-17

## 2023-03-17 VITALS
TEMPERATURE: 98 F | SYSTOLIC BLOOD PRESSURE: 101 MMHG | BODY MASS INDEX: 18.61 KG/M2 | WEIGHT: 109 LBS | HEIGHT: 64 IN | OXYGEN SATURATION: 100 % | RESPIRATION RATE: 16 BRPM | DIASTOLIC BLOOD PRESSURE: 68 MMHG | HEART RATE: 85 BPM

## 2023-03-17 DIAGNOSIS — Z30.017 NEXPLANON INSERTION: ICD-10-CM

## 2023-03-17 DIAGNOSIS — N94.89 SUPPRESSION OF MENSES: Primary | ICD-10-CM

## 2023-03-17 LAB
HCG URINE, QL. (POC): NEGATIVE
VALID INTERNAL CONTROL?: YES

## 2023-03-17 RX ORDER — IBUPROFEN 800 MG/1
800 TABLET ORAL
Qty: 20 TABLET | Refills: 0 | Status: SHIPPED | OUTPATIENT
Start: 2023-03-17

## 2023-03-17 NOTE — PROGRESS NOTES
Minor Schaumann is a 20 y.o. female    Chief Complaint   Patient presents with    Nexplanon     Patient is coming in for a nexplanon insertion. LMP was 03/15/2023. Last sexual intercourse was 1 month ago. No other birth control. She is breastfeeding her baby. Shaina Teague 47: [id number], LOT: [id number], EXP: 04/10/2025. Implant inserted in left arm. No other concerns. 1. Have you been to the ER, urgent care clinic since your last visit? Hospitalized since your last visit? No    2. Have you seen or consulted any other health care providers outside of the [address] [place] [address] since your last visit? Include any pap smears or colon screening. No      Visit Vitals  /68 (BP 1 Location: Right upper arm, BP Patient Position: Sitting)   Pulse 85   Temp 98 °F (36.7 °C) (Oral)   Resp 16   Ht 5' 4\" (1.626 m)   Wt 109 lb (49.4 kg)   SpO2 100%   BMI 18.71 kg/m²           Health Maintenance Due   Topic Date Due    COVID-19 Vaccine (1) Never done    HPV Age 9Y-34Y (1 - 2-dose series) Never done    Flu Vaccine (1) Never done         Medication Reconciliation completed, changes noted.   Please  Update medication list.

## 2023-03-17 NOTE — PROGRESS NOTES
[facility]  OFFICE PROCEDURE PROGRESS NOTE      Chart reviewed for the following:   Francyne Kocher, MD, have reviewed the History, Physical and updated the Allergic reactions for [place] [address] performed immediately prior to start of procedure:   Francyne Kocher, MD, have performed the following reviews on [address] [place] [address] prior to the start of the procedure:            * Patient was identified by name and date of birth   * Agreement on procedure being performed was verified  * Risks and Benefits explained to the patient  * Procedure site verified and marked as necessary  * Patient was positioned for comfort  * Consent was signed and verified     Time: 8:45 AM    Date of procedure: 3/17/2023    Procedure performed by:  Dax Gaston DO    Provider assisted by: Dr. Dax Gaston     Patient assisted by: son and baby daughter. How tolerated by patient: tolerated the procedure well with no complications    Post Procedural Pain Scale: 0 - No Hurt    Comments: none      The patient's Left arm was selected and the proposed site marked with a sterile marking pen. The site was cleaned with chloraprep x3. The site was injected with 3mL 1% lidocaine from insertion to the full extent of the implant. The insertion device was then used to elevated the skin and tunnel under the skin the full length of the implant. The device was then deployed and withdrawn leaving the implant in place. The implant was palpated to ensure proper placement. The insertion device was inspected to insure that the entire device was deployed. Everlena Net was placed over the incision and a pressure wrap was placed around the arm to reduce swelling overnight    The patient was given her information card and reminded that the device should be removed no later than three years and that contrceptive effectiveness was not guaranteed after that date.   The patient expressed understanding. Insertion date: 3/17/2023  Removal date:  3/17/2026  Nexplanon Card given to patient.

## 2023-05-12 ENCOUNTER — TELEPHONE (OUTPATIENT)
Age: 23
End: 2023-05-12

## 2023-05-12 NOTE — TELEPHONE ENCOUNTER
----- Message from Pilar Tatum sent at 2023  9:01 AM EDT -----  Please call pt to inform that Dr. Tobe Apgar will not be in the office on 5/15/23.       Thank you  Amy Rivera

## 2023-05-12 NOTE — TELEPHONE ENCOUNTER
This nurse attempted to contact patient. Unable to leave a message, not accepting call at this time.

## 2023-05-26 ENCOUNTER — HOSPITAL ENCOUNTER (EMERGENCY)
Facility: HOSPITAL | Age: 23
Discharge: HOME | End: 2023-05-26
Attending: EMERGENCY MEDICINE

## 2023-05-26 ENCOUNTER — APPOINTMENT (OUTPATIENT)
Facility: HOSPITAL | Age: 23
End: 2023-05-26

## 2023-05-26 VITALS
WEIGHT: 115 LBS | RESPIRATION RATE: 18 BRPM | DIASTOLIC BLOOD PRESSURE: 82 MMHG | OXYGEN SATURATION: 100 % | TEMPERATURE: 98.2 F | HEART RATE: 82 BPM | BODY MASS INDEX: 19.74 KG/M2 | SYSTOLIC BLOOD PRESSURE: 122 MMHG

## 2023-05-26 DIAGNOSIS — R51.9 ACUTE NONINTRACTABLE HEADACHE, UNSPECIFIED HEADACHE TYPE: Primary | ICD-10-CM

## 2023-05-26 LAB — HCG UR QL: NEGATIVE

## 2023-05-26 PROCEDURE — 99284 EMERGENCY DEPT VISIT MOD MDM: CPT

## 2023-05-26 PROCEDURE — 81025 URINE PREGNANCY TEST: CPT

## 2023-05-26 PROCEDURE — 6370000000 HC RX 637 (ALT 250 FOR IP): Performed by: EMERGENCY MEDICINE

## 2023-05-26 PROCEDURE — 70450 CT HEAD/BRAIN W/O DYE: CPT

## 2023-05-26 RX ORDER — IBUPROFEN 600 MG/1
600 TABLET ORAL
Status: COMPLETED | OUTPATIENT
Start: 2023-05-26 | End: 2023-05-26

## 2023-05-26 RX ADMIN — IBUPROFEN 600 MG: 600 TABLET, FILM COATED ORAL at 18:31

## 2023-05-26 ASSESSMENT — PAIN SCALES - GENERAL: PAINLEVEL_OUTOF10: 8

## 2023-05-26 ASSESSMENT — PAIN DESCRIPTION - ORIENTATION: ORIENTATION: RIGHT

## 2023-05-26 ASSESSMENT — ENCOUNTER SYMPTOMS
COUGH: 0
SORE THROAT: 0
ABDOMINAL PAIN: 0
BACK PAIN: 0

## 2023-05-26 ASSESSMENT — PAIN DESCRIPTION - FREQUENCY: FREQUENCY: INTERMITTENT

## 2023-05-26 ASSESSMENT — PAIN DESCRIPTION - DESCRIPTORS: DESCRIPTORS: ACHING

## 2023-05-26 ASSESSMENT — PAIN DESCRIPTION - PAIN TYPE: TYPE: ACUTE PAIN

## 2023-05-26 ASSESSMENT — PAIN - FUNCTIONAL ASSESSMENT
PAIN_FUNCTIONAL_ASSESSMENT: 0-10
PAIN_FUNCTIONAL_ASSESSMENT: ACTIVITIES ARE NOT PREVENTED

## 2023-05-26 ASSESSMENT — PAIN DESCRIPTION - LOCATION: LOCATION: HEAD

## 2023-05-26 NOTE — ED PROVIDER NOTES
returned as of this dictation. EMERGENCY DEPARTMENT COURSE and DIFFERENTIAL DIAGNOSIS/MDM:   Vitals: There were no vitals filed for this visit. Medical Decision Making  Amount and/or Complexity of Data Reviewed  Labs: ordered. Radiology: ordered. Risk  Prescription drug management. REASSESSMENT          CRITICAL CARE TIME   Total Critical Care time was NA minutes, excluding separately reportable procedures. CONSULTS:  None    PROCEDURES:  Unless otherwise noted below, none     Procedures        FINAL IMPRESSION      1.  Acute nonintractable headache, unspecified headache type          DISPOSITION/PLAN   DISPOSITION        PATIENT REFERRED TO:  Marbin Miguel MD  [facility]  [phone]            DISCHARGE MEDICATIONS:  Discharge Medication List as of 5/26/2023  6:29 PM            (Please note that portions of this note were completed with a voice recognition program.  Efforts were made to edit the dictations but occasionally words are mis-transcribed.)    Jhoana Ramon MD (electronically signed)  Emergency Attending Physician            Henry Amaya MD  05/26/23 5938

## 2023-05-26 NOTE — ED NOTES
Discharge instructions reviewed with patient using  #[id number]. Patient leaves ER ambulatory and in no acute distress.       Ricky Levi RN  05/26/23 5008
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
## 2023-07-20 ENCOUNTER — HOSPITAL ENCOUNTER (EMERGENCY)
Facility: HOSPITAL | Age: 23
Discharge: HOME | End: 2023-07-21
Attending: EMERGENCY MEDICINE
Payer: MEDICAID

## 2023-07-20 VITALS
TEMPERATURE: 97.8 F | HEIGHT: 64 IN | OXYGEN SATURATION: 98 % | SYSTOLIC BLOOD PRESSURE: 120 MMHG | RESPIRATION RATE: 16 BRPM | DIASTOLIC BLOOD PRESSURE: 82 MMHG | WEIGHT: 115 LBS | HEART RATE: 104 BPM | BODY MASS INDEX: 19.63 KG/M2

## 2023-07-20 DIAGNOSIS — N39.0 URINARY TRACT INFECTION WITHOUT HEMATURIA, SITE UNSPECIFIED: Primary | ICD-10-CM

## 2023-07-20 DIAGNOSIS — R10.30 LOWER ABDOMINAL PAIN: ICD-10-CM

## 2023-07-20 LAB
BASOPHILS # BLD: 0.1 K/UL (ref 0–0.1)
BASOPHILS NFR BLD: 0 % (ref 0–1)
COMMENT:: NORMAL
DIFFERENTIAL METHOD BLD: ABNORMAL
EOSINOPHIL # BLD: 0.1 K/UL (ref 0–0.4)
EOSINOPHIL NFR BLD: 1 % (ref 0–7)
ERYTHROCYTE [DISTWIDTH] IN BLOOD BY AUTOMATED COUNT: 12.9 % (ref 11.5–14.5)
HCG UR QL: NEGATIVE
HCT VFR BLD AUTO: 43.6 % (ref 35–47)
HGB BLD-MCNC: 14.6 G/DL (ref 11.5–16)
IMM GRANULOCYTES # BLD AUTO: 0 K/UL (ref 0–0.04)
IMM GRANULOCYTES NFR BLD AUTO: 0 % (ref 0–0.5)
LYMPHOCYTES # BLD: 3.3 K/UL (ref 0.8–3.5)
LYMPHOCYTES NFR BLD: 26 % (ref 12–49)
MCH RBC QN AUTO: 28.6 PG (ref 26–34)
MCHC RBC AUTO-ENTMCNC: 33.5 G/DL (ref 30–36.5)
MCV RBC AUTO: 85.3 FL (ref 80–99)
MONOCYTES # BLD: 1 K/UL (ref 0–1)
MONOCYTES NFR BLD: 8 % (ref 5–13)
NEUTS SEG # BLD: 8.2 K/UL (ref 1.8–8)
NEUTS SEG NFR BLD: 65 % (ref 32–75)
NRBC # BLD: 0 K/UL (ref 0–0.01)
NRBC BLD-RTO: 0 PER 100 WBC
PLATELET # BLD AUTO: 274 K/UL (ref 150–400)
PMV BLD AUTO: 9.7 FL (ref 8.9–12.9)
RBC # BLD AUTO: 5.11 M/UL (ref 3.8–5.2)
SPECIMEN HOLD: NORMAL
WBC # BLD AUTO: 12.6 K/UL (ref 3.6–11)

## 2023-07-20 PROCEDURE — 86140 C-REACTIVE PROTEIN: CPT

## 2023-07-20 PROCEDURE — 81001 URINALYSIS AUTO W/SCOPE: CPT

## 2023-07-20 PROCEDURE — 80053 COMPREHEN METABOLIC PANEL: CPT

## 2023-07-20 PROCEDURE — 83690 ASSAY OF LIPASE: CPT

## 2023-07-20 PROCEDURE — 81025 URINE PREGNANCY TEST: CPT

## 2023-07-20 PROCEDURE — 96375 TX/PRO/DX INJ NEW DRUG ADDON: CPT

## 2023-07-20 PROCEDURE — 85025 COMPLETE CBC W/AUTO DIFF WBC: CPT

## 2023-07-20 PROCEDURE — 99285 EMERGENCY DEPT VISIT HI MDM: CPT

## 2023-07-20 PROCEDURE — 96374 THER/PROPH/DIAG INJ IV PUSH: CPT

## 2023-07-20 PROCEDURE — 6360000002 HC RX W HCPCS: Performed by: EMERGENCY MEDICINE

## 2023-07-20 PROCEDURE — 36415 COLL VENOUS BLD VENIPUNCTURE: CPT

## 2023-07-20 PROCEDURE — 2580000003 HC RX 258: Performed by: EMERGENCY MEDICINE

## 2023-07-20 RX ORDER — KETOROLAC TROMETHAMINE 30 MG/ML
30 INJECTION, SOLUTION INTRAMUSCULAR; INTRAVENOUS
Status: COMPLETED | OUTPATIENT
Start: 2023-07-20 | End: 2023-07-20

## 2023-07-20 RX ORDER — ONDANSETRON 2 MG/ML
4 INJECTION INTRAMUSCULAR; INTRAVENOUS EVERY 6 HOURS PRN
Status: DISCONTINUED | OUTPATIENT
Start: 2023-07-20 | End: 2023-07-21 | Stop reason: HOSPADM

## 2023-07-20 RX ORDER — 0.9 % SODIUM CHLORIDE 0.9 %
1000 INTRAVENOUS SOLUTION INTRAVENOUS ONCE
Status: COMPLETED | OUTPATIENT
Start: 2023-07-20 | End: 2023-07-21

## 2023-07-20 RX ADMIN — SODIUM CHLORIDE 1000 ML: 9 INJECTION, SOLUTION INTRAVENOUS at 23:50

## 2023-07-20 RX ADMIN — ONDANSETRON 4 MG: 2 INJECTION INTRAMUSCULAR; INTRAVENOUS at 23:51

## 2023-07-20 RX ADMIN — KETOROLAC TROMETHAMINE 30 MG: 30 INJECTION, SOLUTION INTRAMUSCULAR; INTRAVENOUS at 23:51

## 2023-07-20 ASSESSMENT — PAIN - FUNCTIONAL ASSESSMENT: PAIN_FUNCTIONAL_ASSESSMENT: 0-10

## 2023-07-20 ASSESSMENT — PAIN SCALES - GENERAL: PAINLEVEL_OUTOF10: 6

## 2023-07-21 ENCOUNTER — APPOINTMENT (OUTPATIENT)
Facility: HOSPITAL | Age: 23
End: 2023-07-21
Payer: MEDICAID

## 2023-07-21 LAB
ALBUMIN SERPL-MCNC: 4.2 G/DL (ref 3.5–5)
ALBUMIN/GLOB SERPL: 1 (ref 1.1–2.2)
ALP SERPL-CCNC: 107 U/L (ref 45–117)
ALT SERPL-CCNC: 19 U/L (ref 12–78)
ANION GAP SERPL CALC-SCNC: 7 MMOL/L (ref 5–15)
APPEARANCE UR: ABNORMAL
AST SERPL-CCNC: 15 U/L (ref 15–37)
BACTERIA URNS QL MICRO: ABNORMAL /HPF
BILIRUB SERPL-MCNC: 1 MG/DL (ref 0.2–1)
BILIRUB UR QL: NEGATIVE
BUN SERPL-MCNC: 12 MG/DL (ref 6–20)
BUN/CREAT SERPL: 16 (ref 12–20)
CALCIUM SERPL-MCNC: 9.7 MG/DL (ref 8.5–10.1)
CHLORIDE SERPL-SCNC: 108 MMOL/L (ref 97–108)
CO2 SERPL-SCNC: 23 MMOL/L (ref 21–32)
COLOR UR: ABNORMAL
CREAT SERPL-MCNC: 0.76 MG/DL (ref 0.55–1.02)
CRP SERPL-MCNC: <0.29 MG/DL (ref 0–0.6)
EPITH CASTS URNS QL MICRO: ABNORMAL /LPF
GLOBULIN SER CALC-MCNC: 4.1 G/DL (ref 2–4)
GLUCOSE SERPL-MCNC: 111 MG/DL (ref 65–100)
GLUCOSE UR STRIP.AUTO-MCNC: NEGATIVE MG/DL
HGB UR QL STRIP: ABNORMAL
KETONES UR QL STRIP.AUTO: 15 MG/DL
LEUKOCYTE ESTERASE UR QL STRIP.AUTO: ABNORMAL
LIPASE SERPL-CCNC: 82 U/L (ref 73–393)
NITRITE UR QL STRIP.AUTO: NEGATIVE
PH UR STRIP: 5 (ref 5–8)
POTASSIUM SERPL-SCNC: 3.7 MMOL/L (ref 3.5–5.1)
PROT SERPL-MCNC: 8.3 G/DL (ref 6.4–8.2)
PROT UR STRIP-MCNC: NEGATIVE MG/DL
RBC #/AREA URNS HPF: ABNORMAL /HPF (ref 0–5)
SODIUM SERPL-SCNC: 138 MMOL/L (ref 136–145)
SP GR UR REFRACTOMETRY: 1.02 (ref 1–1.03)
URINE CULTURE IF INDICATED: ABNORMAL
UROBILINOGEN UR QL STRIP.AUTO: 0.2 EU/DL (ref 0.2–1)
WBC URNS QL MICRO: ABNORMAL /HPF (ref 0–4)

## 2023-07-21 PROCEDURE — 96361 HYDRATE IV INFUSION ADD-ON: CPT

## 2023-07-21 PROCEDURE — 74177 CT ABD & PELVIS W/CONTRAST: CPT

## 2023-07-21 PROCEDURE — 6360000004 HC RX CONTRAST MEDICATION: Performed by: RADIOLOGY

## 2023-07-21 RX ORDER — IBUPROFEN 800 MG/1
800 TABLET ORAL 3 TIMES DAILY PRN
Qty: 90 TABLET | Refills: 0 | Status: SHIPPED | OUTPATIENT
Start: 2023-07-21

## 2023-07-21 RX ORDER — CEPHALEXIN 500 MG/1
500 CAPSULE ORAL 3 TIMES DAILY
Qty: 21 CAPSULE | Refills: 0 | Status: SHIPPED | OUTPATIENT
Start: 2023-07-21 | End: 2023-07-28

## 2023-07-21 RX ADMIN — IOPAMIDOL 100 ML: 755 INJECTION, SOLUTION INTRAVENOUS at 01:01

## 2023-07-21 NOTE — ED TRIAGE NOTES
Patient ambulatory to Triage with c/o lower abdominal pain that started a few days ago that has increasingly gotten worse. Patient denies any N/V/D. Patient also c/o lower back pain that causes her to be weak in the knees. Patient unsure of LMP due to her having implant in for [facility]. Patient has taken a pregnancy test which shows a faint line and is unsure if she could be pregnant.

## 2023-08-15 ENCOUNTER — OFFICE VISIT (OUTPATIENT)
Age: 23
End: 2023-08-15
Payer: MEDICAID

## 2023-08-15 VITALS
HEIGHT: 64 IN | BODY MASS INDEX: 17.07 KG/M2 | WEIGHT: 100 LBS | OXYGEN SATURATION: 100 % | DIASTOLIC BLOOD PRESSURE: 64 MMHG | SYSTOLIC BLOOD PRESSURE: 112 MMHG | HEART RATE: 78 BPM

## 2023-08-15 DIAGNOSIS — R00.2 HEART PALPITATIONS: ICD-10-CM

## 2023-08-15 DIAGNOSIS — R06.02 SHORTNESS OF BREATH: Primary | ICD-10-CM

## 2023-08-15 DIAGNOSIS — I10 ESSENTIAL (PRIMARY) HYPERTENSION: ICD-10-CM

## 2023-08-15 DIAGNOSIS — R94.31 ABNORMAL ELECTROCARDIOGRAM (ECG) (EKG): ICD-10-CM

## 2023-08-15 PROCEDURE — 93005 ELECTROCARDIOGRAM TRACING: CPT | Performed by: SPECIALIST

## 2023-08-15 PROCEDURE — 93010 ELECTROCARDIOGRAM REPORT: CPT | Performed by: SPECIALIST

## 2023-08-15 PROCEDURE — 3074F SYST BP LT 130 MM HG: CPT | Performed by: SPECIALIST

## 2023-08-15 PROCEDURE — 99214 OFFICE O/P EST MOD 30 MIN: CPT | Performed by: SPECIALIST

## 2023-08-15 PROCEDURE — 3078F DIAST BP <80 MM HG: CPT | Performed by: SPECIALIST

## 2023-08-15 RX ORDER — FLUDROCORTISONE ACETATE 0.1 MG/1
0.1 TABLET ORAL DAILY
Qty: 90 TABLET | Refills: 1 | Status: SHIPPED | OUTPATIENT
Start: 2023-08-15

## 2023-08-15 ASSESSMENT — PATIENT HEALTH QUESTIONNAIRE - PHQ9
1. LITTLE INTEREST OR PLEASURE IN DOING THINGS: 0
2. FEELING DOWN, DEPRESSED OR HOPELESS: 0
SUM OF ALL RESPONSES TO PHQ QUESTIONS 1-9: 0
SUM OF ALL RESPONSES TO PHQ9 QUESTIONS 1 & 2: 0
SUM OF ALL RESPONSES TO PHQ QUESTIONS 1-9: 0

## 2023-08-15 NOTE — PATIENT INSTRUCTIONS
Please START Florinef 0.1mg daily    Please have an echocardiogram    A monitor has been ordered for you. It should arrive in about 7-10 business days. Please call our office if you do not receive it after two weeks. This will be mailed to the address given to us.      Follow up in about 5 weeks

## 2023-08-15 NOTE — PROGRESS NOTES
[address] [facility]                                                            OFFICE NOTE        Macarena Mohr M.D.,MAHENDRA Iraj Will   2000  [id number]    Date/Time:  8/15/04541:41 PM            SUBJECTIVE:  Patient had a syncopal episode while standing up at the kitchen. She was seen at [address] [place] [address] work-up at that time was apparently normal.  She is having palpitations and occasional dyspnea no particular chest pain reported. Assessment/Plan  1. Orthostatic hypotension: Patient status post tilt table test in March 2022 revealing orthostatic hypotension with reflex tachycardia. Started on Florinef. Patient now off Florinef since she was pregnant. She has delivered a healthy baby girl in December 2022. I suspect the syncopal is related to low blood pressure    Resume Florinef 0.1 mg daily. Continue with aggressive hydration and salt intake as long as the blood pressure is acceptable. To be noted she also has undergone a event monitor which was unrevealing in terms of any kind of dysrhythmias and only sinus tachycardia was noted. And palpitations will be rechecked. To be noted that the transthoracic echocardiogram was essentially normal in March 2022. Also recheck echocardiogram post delivery. Otherwise see her back after above-mentioned test to be completed. HPI   Very pleasant 24years old female with unremarkable past medical history who presents for cardiac evaluation of the palpitations. Apparently the patient was seen at [facility] for palpitations and she was told she had an arrhythmia but they were not sure what it was released she does not know what it was. She does have dizziness discomfort in the left arm when she does have the palpitations. There is no report of syncopal episode at this point.  There is no history of

## 2023-08-16 ENCOUNTER — TELEPHONE (OUTPATIENT)
Age: 23
End: 2023-08-16

## 2023-08-16 NOTE — TELEPHONE ENCOUNTER
Enrolled with [employer] - Ordered and being shipped to patient's home address on file. ETA within 5-7 business days. ----- Message from Sierra Matias RN sent at 8/15/2023  4:16 PM EDT -----  Please send 2 week holter monitor for palpitations per Dr. Bill Godinez. Thank you!

## 2023-08-29 ENCOUNTER — TELEPHONE (OUTPATIENT)
Age: 23
End: 2023-08-29

## 2023-08-29 NOTE — TELEPHONE ENCOUNTER
Used  Tresa Currie with [facility] and rescheduled patient's 9/27/23 appointment per Ericka Sin NP.      Future Appointments   Date Time Provider [address]   9/25/2023  2:00 PM Ale Roberson, APRN - NP [facility] BS AMB   10/3/2023  8:40 AM Aguilar Adam APRN - NP [facility]ENC[facility] AMB

## 2023-09-26 ENCOUNTER — TELEPHONE (OUTPATIENT)
Age: 23
End: 2023-09-26

## 2023-11-16 ENCOUNTER — TELEPHONE (OUTPATIENT)
Age: 23
End: 2023-11-16

## 2023-11-16 RX ORDER — FLUDROCORTISONE ACETATE 0.1 MG/1
0.1 TABLET ORAL DAILY
Qty: 90 TABLET | Refills: 1 | Status: SHIPPED | OUTPATIENT
Start: 2023-11-16

## 2023-11-16 NOTE — TELEPHONE ENCOUNTER
Pt finished with heart monitor and is unable to send it back due to the instructions are only in [address] [place] [address] and she is unable to understand.        Pt# [phone]

## 2023-11-16 NOTE — TELEPHONE ENCOUNTER
Requested Prescriptions     Signed Prescriptions Disp Refills    fludrocortisone (FLORINEF) 0.1 MG tablet 90 tablet 1     Sig: Take 1 tablet by mouth daily     Authorizing Provider: Thang Baum     Ordering User: Diane Newman     Verbal order per Dr. James Bang.     Future Appointments   Date Time Provider [address]  [address]   12/13/2023 10:00 AM Kayla Urena APRN - NP [facility] BS AMB   1/19/2024 11:40 AM MD BERTO Sanchez BS AMB

## 2023-11-16 NOTE — TELEPHONE ENCOUNTER
Called patient and verified with two identifiers with . She is going to bring the monitor to the office.

## 2023-12-13 ENCOUNTER — TELEPHONE (OUTPATIENT)
Dept: PRIMARY CARE CLINIC | Facility: CLINIC | Age: 23
End: 2023-12-13

## 2023-12-13 NOTE — TELEPHONE ENCOUNTER
Patient had an appt with Ayanna today, but was waiting in the ER instead of our office. Patient is Danish-speaking only and there was a language barrier. Patient arrived at our office 20+ minutes past the appt time, so they needed to be rescheduled. Patient is now scheduled with Vasquez Rust on 12/18.

## 2023-12-18 DIAGNOSIS — Z11.3 SCREEN FOR SEXUALLY TRANSMITTED DISEASES: ICD-10-CM

## 2024-01-02 ENCOUNTER — CLINICAL DOCUMENTATION (OUTPATIENT)
Age: 24
End: 2024-01-02

## 2024-01-02 NOTE — PROGRESS NOTES
Received fax from [employer] requesting insuance card. Faxed to Elton Dunham [phone].

## 2024-01-19 ENCOUNTER — OFFICE VISIT (OUTPATIENT)
Age: 24
End: 2024-01-19

## 2024-01-19 VITALS
HEART RATE: 75 BPM | HEIGHT: 64 IN | WEIGHT: 103 LBS | BODY MASS INDEX: 17.58 KG/M2 | DIASTOLIC BLOOD PRESSURE: 60 MMHG | RESPIRATION RATE: 18 BRPM | SYSTOLIC BLOOD PRESSURE: 100 MMHG | OXYGEN SATURATION: 99 %

## 2024-01-19 DIAGNOSIS — R00.2 HEART PALPITATIONS: Primary | ICD-10-CM

## 2024-01-19 DIAGNOSIS — R07.9 CHEST PAIN, UNSPECIFIED TYPE: ICD-10-CM

## 2024-01-19 PROCEDURE — 99214 OFFICE O/P EST MOD 30 MIN: CPT | Performed by: SPECIALIST

## 2024-01-19 PROCEDURE — 93005 ELECTROCARDIOGRAM TRACING: CPT | Performed by: SPECIALIST

## 2024-01-19 ASSESSMENT — PATIENT HEALTH QUESTIONNAIRE - PHQ9
SUM OF ALL RESPONSES TO PHQ QUESTIONS 1-9: 0
2. FEELING DOWN, DEPRESSED OR HOPELESS: 0
SUM OF ALL RESPONSES TO PHQ QUESTIONS 1-9: 0
SUM OF ALL RESPONSES TO PHQ QUESTIONS 1-9: 0
SUM OF ALL RESPONSES TO PHQ9 QUESTIONS 1 & 2: 0
1. LITTLE INTEREST OR PLEASURE IN DOING THINGS: 0
SUM OF ALL RESPONSES TO PHQ QUESTIONS 1-9: 0

## 2024-01-19 NOTE — PATIENT INSTRUCTIONS
You will be scheduled for a Treadmill Stress Test after your appointment today.    Please wear comfortable clothing (shorts or pants with a shirt or blouse) and walking/athletic shoes.    Do not eat or drink anything, except water, for at least 2 hours prior to your test.     Take your scheduled medications prior to your test.

## 2024-01-19 NOTE — PROGRESS NOTES
[facility]                DIVISION OF CARDIOLOGY          [facility]                                                            OFFICE NOTE        EMILY CORDOVA M.D.,MAHENDRA            CHRISTINE DALY   2000  [id number]    Date/Time:  1/19/202412:37 PM            SUBJECTIVE:  In the last several days she has been complain of chest pain and left arm pain both at rest and with activities.  She has had no presyncopal syncopal episodes occasional dizziness still reported    Current office visit was done with official .       Assessment/Plan    1.  Orthostatic hypotension: Patient status post tilt table test in March 2022 revealing orthostatic hypotension with reflex tachycardia.  Started on Florinef.    Patient now off Florinef since she was pregnant.     She has delivered a healthy baby girl in December 2022.  I suspect the syncopal is related to low blood pressure     Continue Florinef 0.1 mg daily.  Continue with aggressive hydration and salt intake as long as the blood pressure is acceptable.     To be noted she also has undergone a event monitor which was unrevealing in terms of any kind of dysrhythmias and only sinus tachycardia was noted.     Midodrine on the back burner for now.        To be noted that the transthoracic echocardiogram was essentially normal in March 2022.     Echocardiogram in August 2023 with ejection fraction 50 to 55% mild tricuspid regurgitation.    2.  Chest pain arm pain: Her EKG today reveals a normal sinus rhythm without significant ST-T changes and normal intervals.  She is quite concerned about her chest pain and left arm pain.  I suspect noncardiac.  Nonetheless proceed with exercise treadmill test.  I will see her the same day.  Otherwise we will see her back in 1 year after the stress test      HPI     Very pleasant 23 years old female with unremarkable past medical history who presents for cardiac

## 2024-01-22 ENCOUNTER — ANCILLARY PROCEDURE (OUTPATIENT)
Age: 24
End: 2024-01-22

## 2024-01-22 VITALS
BODY MASS INDEX: 17.58 KG/M2 | SYSTOLIC BLOOD PRESSURE: 90 MMHG | WEIGHT: 103 LBS | HEIGHT: 64 IN | DIASTOLIC BLOOD PRESSURE: 62 MMHG | HEART RATE: 83 BPM

## 2024-01-22 DIAGNOSIS — R07.9 CHEST PAIN, UNSPECIFIED TYPE: ICD-10-CM

## 2024-01-22 PROCEDURE — 93017 CV STRESS TEST TRACING ONLY: CPT | Performed by: SPECIALIST

## 2024-01-23 LAB
ECHO BSA: 1.45 M2
STRESS ANGINA INDEX: 0
STRESS BASELINE DIAS BP: 62 MMHG
STRESS BASELINE HR: 83 BPM
STRESS BASELINE SYS BP: 90 MMHG
STRESS ESTIMATED WORKLOAD: 13.4 METS
STRESS EXERCISE DUR MIN: 10 MIN
STRESS EXERCISE DUR SEC: 0 SEC
STRESS O2 SAT PEAK: 100 %
STRESS O2 SAT REST: 99 %
STRESS PEAK DIAS BP: 60 MMHG
STRESS PEAK SYS BP: 124 MMHG
STRESS PERCENT HR ACHIEVED: 94 %
STRESS POST PEAK HR: 184 BPM
STRESS RATE PRESSURE PRODUCT: NORMAL BPM*MMHG
STRESS TARGET HR: 196 BPM

## 2024-05-07 ENCOUNTER — HOSPITAL ENCOUNTER (EMERGENCY)
Facility: HOSPITAL | Age: 24
Discharge: HOME | End: 2024-05-07
Attending: EMERGENCY MEDICINE

## 2024-05-07 VITALS
TEMPERATURE: 98.1 F | OXYGEN SATURATION: 100 % | SYSTOLIC BLOOD PRESSURE: 118 MMHG | BODY MASS INDEX: 18.88 KG/M2 | WEIGHT: 110 LBS | RESPIRATION RATE: 18 BRPM | HEART RATE: 85 BPM | DIASTOLIC BLOOD PRESSURE: 52 MMHG

## 2024-05-07 DIAGNOSIS — R10.13 ABDOMINAL PAIN, EPIGASTRIC: ICD-10-CM

## 2024-05-07 DIAGNOSIS — R51.9 NONINTRACTABLE EPISODIC HEADACHE, UNSPECIFIED HEADACHE TYPE: Primary | ICD-10-CM

## 2024-05-07 DIAGNOSIS — M25.50 ARTHRALGIA, UNSPECIFIED JOINT: ICD-10-CM

## 2024-05-07 LAB
ALBUMIN SERPL-MCNC: 4.2 G/DL (ref 3.5–5)
ALBUMIN/GLOB SERPL: 0.9 (ref 1.1–2.2)
ALP SERPL-CCNC: 107 U/L (ref 45–117)
ALT SERPL-CCNC: 17 U/L (ref 12–78)
ANION GAP SERPL CALC-SCNC: 1 MMOL/L (ref 5–15)
AST SERPL-CCNC: 13 U/L (ref 15–37)
BASOPHILS # BLD: 0 K/UL (ref 0–0.1)
BASOPHILS NFR BLD: 0 % (ref 0–1)
BILIRUB SERPL-MCNC: 0.4 MG/DL (ref 0.2–1)
BUN SERPL-MCNC: 17 MG/DL (ref 6–20)
BUN/CREAT SERPL: 22 (ref 12–20)
CALCIUM SERPL-MCNC: 9.4 MG/DL (ref 8.5–10.1)
CHLORIDE SERPL-SCNC: 106 MMOL/L (ref 97–108)
CO2 SERPL-SCNC: 29 MMOL/L (ref 21–32)
COMMENT:: NORMAL
CREAT SERPL-MCNC: 0.76 MG/DL (ref 0.55–1.02)
DIFFERENTIAL METHOD BLD: ABNORMAL
EOSINOPHIL # BLD: 0.3 K/UL (ref 0–0.4)
EOSINOPHIL NFR BLD: 3 % (ref 0–7)
ERYTHROCYTE [DISTWIDTH] IN BLOOD BY AUTOMATED COUNT: 12.8 % (ref 11.5–14.5)
GLOBULIN SER CALC-MCNC: 4.6 G/DL (ref 2–4)
GLUCOSE SERPL-MCNC: 116 MG/DL (ref 65–100)
HCT VFR BLD AUTO: 44.6 % (ref 35–47)
HGB BLD-MCNC: 14.7 G/DL (ref 11.5–16)
IMM GRANULOCYTES # BLD AUTO: 0.1 K/UL (ref 0–0.04)
IMM GRANULOCYTES NFR BLD AUTO: 1 % (ref 0–0.5)
LYMPHOCYTES # BLD: 3.1 K/UL (ref 0.8–3.5)
LYMPHOCYTES NFR BLD: 34 % (ref 12–49)
MAGNESIUM SERPL-MCNC: 2.6 MG/DL (ref 1.6–2.4)
MCH RBC QN AUTO: 28.2 PG (ref 26–34)
MCHC RBC AUTO-ENTMCNC: 33 G/DL (ref 30–36.5)
MCV RBC AUTO: 85.4 FL (ref 80–99)
MONOCYTES # BLD: 0.9 K/UL (ref 0–1)
MONOCYTES NFR BLD: 10 % (ref 5–13)
NEUTS SEG # BLD: 4.8 K/UL (ref 1.8–8)
NEUTS SEG NFR BLD: 52 % (ref 32–75)
NRBC # BLD: 0 K/UL (ref 0–0.01)
NRBC BLD-RTO: 0 PER 100 WBC
PLATELET # BLD AUTO: 292 K/UL (ref 150–400)
PMV BLD AUTO: 9 FL (ref 8.9–12.9)
POTASSIUM SERPL-SCNC: 3.8 MMOL/L (ref 3.5–5.1)
PROT SERPL-MCNC: 8.8 G/DL (ref 6.4–8.2)
RBC # BLD AUTO: 5.22 M/UL (ref 3.8–5.2)
SODIUM SERPL-SCNC: 136 MMOL/L (ref 136–145)
SPECIMEN HOLD: NORMAL
WBC # BLD AUTO: 9.2 K/UL (ref 3.6–11)

## 2024-05-07 PROCEDURE — 36415 COLL VENOUS BLD VENIPUNCTURE: CPT

## 2024-05-07 PROCEDURE — 99284 EMERGENCY DEPT VISIT MOD MDM: CPT

## 2024-05-07 PROCEDURE — 99283 EMERGENCY DEPT VISIT LOW MDM: CPT

## 2024-05-07 PROCEDURE — 6370000000 HC RX 637 (ALT 250 FOR IP): Performed by: EMERGENCY MEDICINE

## 2024-05-07 PROCEDURE — 85025 COMPLETE CBC W/AUTO DIFF WBC: CPT

## 2024-05-07 PROCEDURE — 80053 COMPREHEN METABOLIC PANEL: CPT

## 2024-05-07 PROCEDURE — 83735 ASSAY OF MAGNESIUM: CPT

## 2024-05-07 PROCEDURE — 2580000003 HC RX 258: Performed by: EMERGENCY MEDICINE

## 2024-05-07 RX ORDER — PROCHLORPERAZINE MALEATE 5 MG/1
10 TABLET ORAL ONCE
Status: COMPLETED | OUTPATIENT
Start: 2024-05-07 | End: 2024-05-07

## 2024-05-07 RX ORDER — 0.9 % SODIUM CHLORIDE 0.9 %
1000 INTRAVENOUS SOLUTION INTRAVENOUS ONCE
Status: COMPLETED | OUTPATIENT
Start: 2024-05-07 | End: 2024-05-07

## 2024-05-07 RX ORDER — CETIRIZINE HYDROCHLORIDE 10 MG/1
10 TABLET ORAL DAILY
Qty: 30 TABLET | Refills: 0 | Status: SHIPPED | OUTPATIENT
Start: 2024-05-07

## 2024-05-07 RX ORDER — IBUPROFEN 600 MG/1
600 TABLET ORAL
Status: COMPLETED | OUTPATIENT
Start: 2024-05-07 | End: 2024-05-07

## 2024-05-07 RX ORDER — DIPHENHYDRAMINE HCL 25 MG
50 CAPSULE ORAL
Status: COMPLETED | OUTPATIENT
Start: 2024-05-07 | End: 2024-05-07

## 2024-05-07 RX ORDER — ACETAMINOPHEN 500 MG
1000 TABLET ORAL
Status: COMPLETED | OUTPATIENT
Start: 2024-05-07 | End: 2024-05-07

## 2024-05-07 RX ADMIN — IBUPROFEN 600 MG: 600 TABLET, FILM COATED ORAL at 13:26

## 2024-05-07 RX ADMIN — DOCUSATE SODIUM LIQUID 10 MG: 100 LIQUID ORAL at 11:50

## 2024-05-07 RX ADMIN — ACETAMINOPHEN 1000 MG: 500 TABLET ORAL at 13:26

## 2024-05-07 RX ADMIN — DIPHENHYDRAMINE HYDROCHLORIDE 50 MG: 25 CAPSULE ORAL at 13:26

## 2024-05-07 RX ADMIN — PROCHLORPERAZINE MALEATE 10 MG: 5 TABLET ORAL at 13:26

## 2024-05-07 RX ADMIN — SODIUM CHLORIDE 1000 ML: 9 INJECTION, SOLUTION INTRAVENOUS at 13:28

## 2024-05-07 ASSESSMENT — PAIN SCALES - GENERAL: PAINLEVEL_OUTOF10: 2

## 2024-05-07 ASSESSMENT — PAIN DESCRIPTION - DESCRIPTORS: DESCRIPTORS: ACHING

## 2024-05-07 ASSESSMENT — PAIN DESCRIPTION - ORIENTATION: ORIENTATION: RIGHT

## 2024-05-07 ASSESSMENT — PAIN DESCRIPTION - LOCATION: LOCATION: HEAD

## 2024-05-07 NOTE — ED TRIAGE NOTES
used in triage     Pt arrives to the ER for complaints of swelling to the right side of her face, right hand and BLE. Pt also reports numbness around lips.     Pt states that she has bilateral ear pain, joint pain, abdominal pain, chills in which they started three days ago.     Pt describes the abdominal pain as a burning.     Pt has been taking tylenol with no relief.

## 2024-05-07 NOTE — ED PROVIDER NOTES
[facility] EMERGENCY DEPT  EMERGENCY DEPARTMENT ENCOUNTER      Pt Name: Jess Saunders  MRN: [id number]  Birthdate 2000  Date of evaluation: 5/7/2024  Provider: Sherine Gonzalez MD    CHIEF COMPLAINT       Chief Complaint   Patient presents with    Facial Swelling    Abdominal Pain    Otalgia         HISTORY OF PRESENT ILLNESS   (Location/Symptom, Timing/Onset, Context/Setting, Quality, Duration, Modifying Factors, Severity)  Note limiting factors.   Patient woke up with swelling on the right side of her face, right hand, and both feet. She also has pain in both ears, burning in her stomach, and joint pain, and trouble breathing, and numbness around my lips.  Symptoms started 3 days ago and she has had chills as well.  She reports lightheadedness.  She also reports back pain and headache.  She took tylenol for symptoms with no improvement.    The history is provided by the patient. The history is limited by a language barrier. A  was used.         Review of External Medical Records:     Nursing Notes were reviewed.    REVIEW OF SYSTEMS    (2-9 systems for level 4, 10 or more for level 5)     Review of Systems    Except as noted above the remainder of the review of systems was reviewed and negative.       PAST MEDICAL HISTORY     Past Medical History:   Diagnosis Date    Orthostatic hypotension          SURGICAL HISTORY     No past surgical history on file.      CURRENT MEDICATIONS       Previous Medications    FLUDROCORTISONE (FLORINEF) 0.1 MG TABLET    Take 1 tablet by mouth daily       ALLERGIES     Macadamia nut oil and Peanut-containing drug products    FAMILY HISTORY       Family History   Problem Relation Age of Onset    Lung Cancer Maternal Grandmother     Diabetes Paternal Grandmother           SOCIAL HISTORY       Social History     Socioeconomic History    Marital status: Single   Tobacco Use    Smoking status: Never    Smokeless tobacco: Never   Substance and Sexual

## 2024-05-18 ENCOUNTER — HOSPITAL ENCOUNTER (EMERGENCY)
Facility: HOSPITAL | Age: 24
Discharge: HOME | End: 2024-05-18
Attending: STUDENT IN AN ORGANIZED HEALTH CARE EDUCATION/TRAINING PROGRAM

## 2024-05-18 VITALS
HEIGHT: 64 IN | DIASTOLIC BLOOD PRESSURE: 64 MMHG | BODY MASS INDEX: 18.78 KG/M2 | HEART RATE: 88 BPM | TEMPERATURE: 98.6 F | SYSTOLIC BLOOD PRESSURE: 99 MMHG | WEIGHT: 110 LBS | OXYGEN SATURATION: 100 % | RESPIRATION RATE: 16 BRPM

## 2024-05-18 DIAGNOSIS — R31.9 URINARY TRACT INFECTION WITH HEMATURIA, SITE UNSPECIFIED: ICD-10-CM

## 2024-05-18 DIAGNOSIS — N39.0 URINARY TRACT INFECTION WITH HEMATURIA, SITE UNSPECIFIED: ICD-10-CM

## 2024-05-18 DIAGNOSIS — R42 DIZZINESS: Primary | ICD-10-CM

## 2024-05-18 LAB
ALBUMIN SERPL-MCNC: 4.1 G/DL (ref 3.5–5)
ALBUMIN/GLOB SERPL: 1 (ref 1.1–2.2)
ALP SERPL-CCNC: 110 U/L (ref 45–117)
ALT SERPL-CCNC: 24 U/L (ref 12–78)
ANION GAP SERPL CALC-SCNC: 2 MMOL/L (ref 5–15)
APPEARANCE UR: CLEAR
AST SERPL-CCNC: 15 U/L (ref 15–37)
BACTERIA URNS QL MICRO: ABNORMAL /HPF
BASOPHILS # BLD: 0.1 K/UL (ref 0–0.1)
BASOPHILS NFR BLD: 0 % (ref 0–1)
BILIRUB SERPL-MCNC: 0.5 MG/DL (ref 0.2–1)
BILIRUB UR QL: NEGATIVE
BUN SERPL-MCNC: 12 MG/DL (ref 6–20)
BUN/CREAT SERPL: 16 (ref 12–20)
CALCIUM SERPL-MCNC: 9.6 MG/DL (ref 8.5–10.1)
CHLORIDE SERPL-SCNC: 106 MMOL/L (ref 97–108)
CO2 SERPL-SCNC: 28 MMOL/L (ref 21–32)
COLOR UR: ABNORMAL
CREAT SERPL-MCNC: 0.75 MG/DL (ref 0.55–1.02)
DIFFERENTIAL METHOD BLD: ABNORMAL
EOSINOPHIL # BLD: 0.1 K/UL (ref 0–0.4)
EOSINOPHIL NFR BLD: 1 % (ref 0–7)
EPITH CASTS URNS QL MICRO: ABNORMAL /LPF
ERYTHROCYTE [DISTWIDTH] IN BLOOD BY AUTOMATED COUNT: 13 % (ref 11.5–14.5)
GLOBULIN SER CALC-MCNC: 4.3 G/DL (ref 2–4)
GLUCOSE SERPL-MCNC: 122 MG/DL (ref 65–100)
GLUCOSE UR STRIP.AUTO-MCNC: NEGATIVE MG/DL
HCG UR QL: NEGATIVE
HCT VFR BLD AUTO: 42.7 % (ref 35–47)
HGB BLD-MCNC: 14.3 G/DL (ref 11.5–16)
HGB UR QL STRIP: ABNORMAL
HYALINE CASTS URNS QL MICRO: ABNORMAL /LPF (ref 0–2)
IMM GRANULOCYTES # BLD AUTO: 0.1 K/UL (ref 0–0.04)
IMM GRANULOCYTES NFR BLD AUTO: 1 % (ref 0–0.5)
KETONES UR QL STRIP.AUTO: ABNORMAL MG/DL
LEUKOCYTE ESTERASE UR QL STRIP.AUTO: ABNORMAL
LYMPHOCYTES # BLD: 2.6 K/UL (ref 0.8–3.5)
LYMPHOCYTES NFR BLD: 22 % (ref 12–49)
MCH RBC QN AUTO: 28.1 PG (ref 26–34)
MCHC RBC AUTO-ENTMCNC: 33.5 G/DL (ref 30–36.5)
MCV RBC AUTO: 84.1 FL (ref 80–99)
MONOCYTES # BLD: 0.6 K/UL (ref 0–1)
MONOCYTES NFR BLD: 5 % (ref 5–13)
NEUTS SEG # BLD: 8.2 K/UL (ref 1.8–8)
NEUTS SEG NFR BLD: 71 % (ref 32–75)
NITRITE UR QL STRIP.AUTO: POSITIVE
NRBC # BLD: 0 K/UL (ref 0–0.01)
NRBC BLD-RTO: 0 PER 100 WBC
PH UR STRIP: 6.5 (ref 5–8)
PLATELET # BLD AUTO: 345 K/UL (ref 150–400)
PMV BLD AUTO: 8.8 FL (ref 8.9–12.9)
POTASSIUM SERPL-SCNC: 4 MMOL/L (ref 3.5–5.1)
PROT SERPL-MCNC: 8.4 G/DL (ref 6.4–8.2)
PROT UR STRIP-MCNC: NEGATIVE MG/DL
RBC # BLD AUTO: 5.08 M/UL (ref 3.8–5.2)
RBC #/AREA URNS HPF: ABNORMAL /HPF (ref 0–5)
SODIUM SERPL-SCNC: 136 MMOL/L (ref 136–145)
SP GR UR REFRACTOMETRY: 1.02 (ref 1–1.03)
UROBILINOGEN UR QL STRIP.AUTO: 1 EU/DL (ref 0.2–1)
WBC # BLD AUTO: 11.6 K/UL (ref 3.6–11)
WBC URNS QL MICRO: ABNORMAL /HPF (ref 0–4)

## 2024-05-18 PROCEDURE — 81025 URINE PREGNANCY TEST: CPT

## 2024-05-18 PROCEDURE — 2580000003 HC RX 258: Performed by: NURSE PRACTITIONER

## 2024-05-18 PROCEDURE — 81001 URINALYSIS AUTO W/SCOPE: CPT

## 2024-05-18 PROCEDURE — 85025 COMPLETE CBC W/AUTO DIFF WBC: CPT

## 2024-05-18 PROCEDURE — 80053 COMPREHEN METABOLIC PANEL: CPT

## 2024-05-18 PROCEDURE — 99284 EMERGENCY DEPT VISIT MOD MDM: CPT

## 2024-05-18 PROCEDURE — 36415 COLL VENOUS BLD VENIPUNCTURE: CPT

## 2024-05-18 PROCEDURE — 96360 HYDRATION IV INFUSION INIT: CPT

## 2024-05-18 RX ORDER — CEPHALEXIN 500 MG/1
500 CAPSULE ORAL 4 TIMES DAILY
Qty: 28 CAPSULE | Refills: 0 | Status: SHIPPED | OUTPATIENT
Start: 2024-05-18 | End: 2024-05-25

## 2024-05-18 RX ORDER — 0.9 % SODIUM CHLORIDE 0.9 %
1000 INTRAVENOUS SOLUTION INTRAVENOUS ONCE
Status: COMPLETED | OUTPATIENT
Start: 2024-05-18 | End: 2024-05-18

## 2024-05-18 RX ADMIN — SODIUM CHLORIDE 1000 ML: 9 INJECTION, SOLUTION INTRAVENOUS at 13:26

## 2024-05-18 ASSESSMENT — PAIN - FUNCTIONAL ASSESSMENT
PAIN_FUNCTIONAL_ASSESSMENT: NONE - DENIES PAIN
PAIN_FUNCTIONAL_ASSESSMENT: 0-10

## 2024-05-18 ASSESSMENT — PAIN SCALES - GENERAL: PAINLEVEL_OUTOF10: 0

## 2024-05-18 NOTE — ED NOTES
Discharge instructions reviewed with patient who verbalized understanding. Opportunity for questions provided

## 2024-05-18 NOTE — ED PROVIDER NOTES
[facility] EMERGENCY DEPT  EMERGENCY DEPARTMENT ENCOUNTER      Pt Name: Jess Saunders  MRN: [id number]  Birthdate 2000  Date of evaluation: 5/18/2024  Provider: JESS Tenorio NP      HISTORY OF PRESENT ILLNESS      HPI        Nursing Notes were reviewed.    REVIEW OF SYSTEMS         Review of Systems        PAST MEDICAL HISTORY     Past Medical History:   Diagnosis Date    Orthostatic hypotension          SURGICAL HISTORY     No past surgical history on file.      CURRENT MEDICATIONS       Previous Medications    CETIRIZINE (ZYRTEC) 10 MG TABLET    Take 1 tablet by mouth daily    FLUDROCORTISONE (FLORINEF) 0.1 MG TABLET    Take 1 tablet by mouth daily       ALLERGIES     Macadamia nut oil and Peanut-containing drug products    FAMILY HISTORY       Family History   Problem Relation Age of Onset    Lung Cancer Maternal Grandmother     Diabetes Paternal Grandmother           SOCIAL HISTORY       Social History     Socioeconomic History    Marital status: Single   Tobacco Use    Smoking status: Never    Smokeless tobacco: Never   Substance and Sexual Activity    Alcohol use: Never    Drug use: Never     Social Determinants of Health     Financial Resource Strain: Low Risk  (12/18/2023)    Overall Financial Resource Strain (CARDIA)     Difficulty of Paying Living Expenses: Not hard at all   Food Insecurity: No Food Insecurity (12/18/2023)    Hunger Vital Sign     Worried About Running Out of Food in the Last Year: Never true     Ran Out of Food in the Last Year: Never true   Transportation Needs: Unknown (12/18/2023)    PRAPARE - Transportation     Lack of Transportation (Non-Medical): No   Housing Stability: Unknown (12/18/2023)    Housing Stability Vital Sign     Unstable Housing in the Last Year: No         PHYSICAL EXAM       ED Triage Vitals [05/18/24 1244]   BP Temp Temp Source Pulse Respirations SpO2 Height Weight - Scale   (!) 113/6 98.6 °F (37 °C) Oral 88 16 100 % 1.626 m (5'  she took her fludrocortisone today after not taking it for three months. States she wanted to \"get her blood pressure down. Patient states she was feeling ill so she decided to take two tabs instead of one so it would \"work faster.\"  Comes to the emergency room due to acute onset dizziness and a feeling of being \"sensitive.\"  Denies any chest pressure, shortness of breath, nausea or vomiting, fevers or chills.   was used during full assessment.    After full physical examination, laboratory data was obtained.  Point-of-care pregnancy test was negative.  Patient does have a nitrite positive urinalysis.  I will treat with p.o. antibiotics.  CMP and CBC were both unremarkable with the exception of a mildly elevated WBC count of 11.6 consistent with a prior diagnosis of urinary tract infection.  Patient was given 1 L of IV fluids in the emergency room with improvement of her blood pressure.  Discharged home and will follow-up with PCP.  Education on proper medication management.  Patient is in agreement with plan of care.    Any available vitals, labs, images, nursing notes, medications, allergies, PMH, PSH and/or previous records in the chart were reviewed. All of these were considered in the medical decision making process. I individually reviewed any labs and any images obtained. This was discussed with my attending and he/she is in agreement with plan of care.       Problems Addressed:  Dizziness: acute illness or injury  Urinary tract infection with hematuria, site unspecified: acute illness or injury    Amount and/or Complexity of Data Reviewed  Labs: ordered. Decision-making details documented in ED Course.    Risk  Prescription drug management.      REASSESSMENT        CONSULTS:  None    PROCEDURES:     Procedures    Labs Reviewed   CBC WITH AUTO DIFFERENTIAL - Abnormal; Notable for the following components:       Result Value    WBC 11.6 (*)     MPV 8.8 (*)     Immature Granulocytes % 1 (*)

## 2024-05-18 NOTE — ED TRIAGE NOTES
used in triage     Pt arrives to the ER for complaints of dizziness and feeling \"sensitive\"     Reports taking fludrocortisone for three months. Reports today was the first day she started to feel dizzy.      Pt reports that she took two pills instead of her typical one.

## 2024-05-21 ASSESSMENT — ENCOUNTER SYMPTOMS
NAUSEA: 0
EYE REDNESS: 0
EYE PAIN: 0
SINUS PRESSURE: 0
SINUS PAIN: 0
COUGH: 0
ABDOMINAL DISTENTION: 0
VOMITING: 0
COLOR CHANGE: 0
SHORTNESS OF BREATH: 0
ABDOMINAL PAIN: 0
TROUBLE SWALLOWING: 0

## 2024-06-07 ENCOUNTER — OFFICE VISIT (OUTPATIENT)
Age: 24
End: 2024-06-07

## 2024-06-07 VITALS
RESPIRATION RATE: 14 BRPM | HEIGHT: 64 IN | DIASTOLIC BLOOD PRESSURE: 66 MMHG | SYSTOLIC BLOOD PRESSURE: 105 MMHG | WEIGHT: 112.8 LBS | OXYGEN SATURATION: 99 % | BODY MASS INDEX: 19.26 KG/M2 | TEMPERATURE: 98.5 F | HEART RATE: 87 BPM

## 2024-06-07 DIAGNOSIS — R30.0 DYSURIA: Primary | ICD-10-CM

## 2024-06-07 DIAGNOSIS — Z59.7 INSUFFICIENT SOCIAL INSURANCE AND WELFARE SUPPORT: Primary | ICD-10-CM

## 2024-06-07 LAB
BILIRUBIN, URINE, POC: NEGATIVE
BLOOD URINE, POC: NORMAL
GLUCOSE URINE, POC: NEGATIVE
KETONES, URINE, POC: NEGATIVE
LEUKOCYTE ESTERASE, URINE, POC: NEGATIVE
NITRITE, URINE, POC: NEGATIVE
PH, URINE, POC: 5.5 (ref 4.6–8)
PROTEIN,URINE, POC: NEGATIVE
SPECIFIC GRAVITY, URINE, POC: 1.02 (ref 1–1.03)
URINALYSIS CLARITY, POC: CLEAR
URINALYSIS COLOR, POC: YELLOW
UROBILINOGEN, POC: NORMAL

## 2024-06-07 PROCEDURE — 99213 OFFICE O/P EST LOW 20 MIN: CPT | Performed by: STUDENT IN AN ORGANIZED HEALTH CARE EDUCATION/TRAINING PROGRAM

## 2024-06-07 PROCEDURE — 81003 URINALYSIS AUTO W/O SCOPE: CPT | Performed by: STUDENT IN AN ORGANIZED HEALTH CARE EDUCATION/TRAINING PROGRAM

## 2024-06-07 RX ORDER — NITROFURANTOIN 25; 75 MG/1; MG/1
100 CAPSULE ORAL 2 TIMES DAILY
Qty: 10 CAPSULE | Refills: 0 | Status: SHIPPED | OUTPATIENT
Start: 2024-06-07 | End: 2024-06-12

## 2024-06-07 SDOH — ECONOMIC STABILITY - INCOME SECURITY: INSUFFICIENT SOCIAL INSURANCE AND WELFARE SUPPORT: Z59.7

## 2024-06-07 NOTE — PROGRESS NOTES
Session Code 18545  / : Douglas #[id number]       Jess Saunders is a 24 y.o. female    Chief Complaint   Patient presents with    Urinary Frequency     And burning on going a few days and patient has finished a round of ABX about 15 days ago.       \"Have you been to the ER, urgent care clinic since your last visit?  Hospitalized since your last visit?\"    NO    “Have you seen or consulted any other health care providers outside of [facility] since your last visit?”    NO              There were no vitals filed for this visit.       No data to display              Health Maintenance Due   Topic Date Due    Hepatitis B vaccine (1 of 3 - 3-dose series) Never done    COVID-19 Vaccine (1) Never done    Varicella vaccine (1 of 2 - 2-dose childhood series) Never done    HPV vaccine (1 - 2-dose series) Never done

## 2024-06-07 NOTE — PROGRESS NOTES
[address] [place] [address]   Office [phone], Fax [phone]    Subjective:     Chief Complaint   Patient presents with    Urinary Frequency     And burning on going a few days and patient has finished a round of ABX about 15 days ago.   Due to language barrier, an  was present during the history-taking and subsequent discussion (and for part of the physical exam) with this patient.  #[id number]    HPI:  Jess Saunders is a 24 y.o. female that presents for: more than 15 days of burning with urination.  She was seen in the ED on 5/18/2024 and was diagnosed with UTI and prescribed course of Keflex.  Patient reports some improvement with antibiotics but her symptoms never resolved and has been persisting since then.  She denies any fevers, chills, nausea, vomiting, diarrhea or any other complaints time.          ROS:   Review of Systems      Health Maintenance:  Health Maintenance Due   Topic Date Due    Hepatitis B vaccine (1 of 3 - 3-dose series) Never done    COVID-19 Vaccine (1) Never done    Varicella vaccine (1 of 2 - 2-dose childhood series) Never done    HPV vaccine (1 - 2-dose series) Never done        Past Medical Hx  I personally reviewed.  Past Medical History:   Diagnosis Date    Orthostatic hypotension         SocHx   I personally reviewed.  Social Determinants of Health     Tobacco Use: Low Risk  (1/19/2024)    Patient History     Smoking Tobacco Use: Never     Smokeless Tobacco Use: Never     Passive Exposure: Not on file   Alcohol Use: Not on file   Financial Resource Strain: Low Risk  (12/18/2023)    Overall Financial Resource Strain (CARDIA)     Difficulty of Paying Living Expenses: Not hard at all   Food Insecurity: No Food Insecurity (12/18/2023)    Hunger Vital Sign     Worried About Running Out of Food in the Last Year: Never true     Ran Out of Food in the Last Year: Never true   Transportation Needs: Unknown (12/18/2023)    PRAPARE -

## 2024-06-18 DIAGNOSIS — R30.0 DYSURIA: Primary | ICD-10-CM

## 2024-06-18 RX ORDER — NITROFURANTOIN 25; 75 MG/1; MG/1
100 CAPSULE ORAL 2 TIMES DAILY
Qty: 10 CAPSULE | Refills: 0 | Status: SHIPPED | OUTPATIENT
Start: 2024-06-18 | End: 2024-06-23

## 2024-06-18 NOTE — PROGRESS NOTES
I saw the patient's daughter in clinic today and she explains she never picked up her antibiotic for possible UTI.  She is still having symptoms.  Antibiotic sent to pharmacy.

## 2024-07-02 ENCOUNTER — HOSPITAL ENCOUNTER (EMERGENCY)
Facility: HOSPITAL | Age: 24
Discharge: HOME | End: 2024-07-03
Attending: STUDENT IN AN ORGANIZED HEALTH CARE EDUCATION/TRAINING PROGRAM

## 2024-07-02 DIAGNOSIS — R07.89 ATYPICAL CHEST PAIN: Primary | ICD-10-CM

## 2024-07-02 DIAGNOSIS — R51.9 ACUTE NONINTRACTABLE HEADACHE, UNSPECIFIED HEADACHE TYPE: ICD-10-CM

## 2024-07-02 DIAGNOSIS — R42 LIGHTHEADEDNESS: ICD-10-CM

## 2024-07-02 LAB
BASOPHILS # BLD: 0 K/UL (ref 0–0.1)
BASOPHILS NFR BLD: 0 % (ref 0–1)
COMMENT:: NORMAL
DIFFERENTIAL METHOD BLD: NORMAL
EOSINOPHIL # BLD: 0.2 K/UL (ref 0–0.4)
EOSINOPHIL NFR BLD: 2 % (ref 0–7)
ERYTHROCYTE [DISTWIDTH] IN BLOOD BY AUTOMATED COUNT: 13.2 % (ref 11.5–14.5)
HCG UR QL: NEGATIVE
HCT VFR BLD AUTO: 42.7 % (ref 35–47)
HGB BLD-MCNC: 14.8 G/DL (ref 11.5–16)
IMM GRANULOCYTES # BLD AUTO: 0 K/UL (ref 0–0.04)
IMM GRANULOCYTES NFR BLD AUTO: 0 % (ref 0–0.5)
LYMPHOCYTES # BLD: 2.4 K/UL (ref 0.8–3.5)
LYMPHOCYTES NFR BLD: 25 % (ref 12–49)
MCH RBC QN AUTO: 29 PG (ref 26–34)
MCHC RBC AUTO-ENTMCNC: 34.7 G/DL (ref 30–36.5)
MCV RBC AUTO: 83.6 FL (ref 80–99)
MONOCYTES # BLD: 0.9 K/UL (ref 0–1)
MONOCYTES NFR BLD: 9 % (ref 5–13)
NEUTS SEG # BLD: 6 K/UL (ref 1.8–8)
NEUTS SEG NFR BLD: 64 % (ref 32–75)
NRBC # BLD: 0 K/UL (ref 0–0.01)
NRBC BLD-RTO: 0 PER 100 WBC
PLATELET # BLD AUTO: 318 K/UL (ref 150–400)
PMV BLD AUTO: 9.4 FL (ref 8.9–12.9)
RBC # BLD AUTO: 5.11 M/UL (ref 3.8–5.2)
SPECIMEN HOLD: NORMAL
WBC # BLD AUTO: 9.6 K/UL (ref 3.6–11)

## 2024-07-02 PROCEDURE — 81025 URINE PREGNANCY TEST: CPT

## 2024-07-02 PROCEDURE — 93005 ELECTROCARDIOGRAM TRACING: CPT | Performed by: INTERNAL MEDICINE

## 2024-07-02 PROCEDURE — 96374 THER/PROPH/DIAG INJ IV PUSH: CPT

## 2024-07-02 PROCEDURE — 36415 COLL VENOUS BLD VENIPUNCTURE: CPT

## 2024-07-02 PROCEDURE — 99285 EMERGENCY DEPT VISIT HI MDM: CPT

## 2024-07-02 PROCEDURE — 2580000003 HC RX 258: Performed by: PHYSICIAN ASSISTANT

## 2024-07-02 PROCEDURE — 85025 COMPLETE CBC W/AUTO DIFF WBC: CPT

## 2024-07-02 PROCEDURE — 80053 COMPREHEN METABOLIC PANEL: CPT

## 2024-07-02 PROCEDURE — 84484 ASSAY OF TROPONIN QUANT: CPT

## 2024-07-02 PROCEDURE — 6360000002 HC RX W HCPCS: Performed by: PHYSICIAN ASSISTANT

## 2024-07-02 RX ORDER — KETOROLAC TROMETHAMINE 15 MG/ML
15 INJECTION, SOLUTION INTRAMUSCULAR; INTRAVENOUS
Status: COMPLETED | OUTPATIENT
Start: 2024-07-02 | End: 2024-07-02

## 2024-07-02 RX ORDER — 0.9 % SODIUM CHLORIDE 0.9 %
1000 INTRAVENOUS SOLUTION INTRAVENOUS ONCE
Status: COMPLETED | OUTPATIENT
Start: 2024-07-02 | End: 2024-07-03

## 2024-07-02 RX ADMIN — KETOROLAC TROMETHAMINE 15 MG: 15 INJECTION, SOLUTION INTRAMUSCULAR; INTRAVENOUS at 23:32

## 2024-07-02 RX ADMIN — SODIUM CHLORIDE 1000 ML: 9 INJECTION, SOLUTION INTRAVENOUS at 23:29

## 2024-07-02 ASSESSMENT — PAIN SCALES - GENERAL
PAINLEVEL_OUTOF10: 3
PAINLEVEL_OUTOF10: 3

## 2024-07-02 ASSESSMENT — PAIN - FUNCTIONAL ASSESSMENT: PAIN_FUNCTIONAL_ASSESSMENT: 0-10

## 2024-07-02 ASSESSMENT — PAIN DESCRIPTION - LOCATION
LOCATION: HEAD
LOCATION: HEAD

## 2024-07-02 ASSESSMENT — ENCOUNTER SYMPTOMS: SHORTNESS OF BREATH: 0

## 2024-07-03 ENCOUNTER — APPOINTMENT (OUTPATIENT)
Facility: HOSPITAL | Age: 24
End: 2024-07-03

## 2024-07-03 VITALS
OXYGEN SATURATION: 100 % | RESPIRATION RATE: 16 BRPM | TEMPERATURE: 98.4 F | HEART RATE: 73 BPM | BODY MASS INDEX: 19.22 KG/M2 | SYSTOLIC BLOOD PRESSURE: 96 MMHG | DIASTOLIC BLOOD PRESSURE: 69 MMHG | WEIGHT: 112 LBS

## 2024-07-03 LAB
ALBUMIN SERPL-MCNC: 4.2 G/DL (ref 3.5–5)
ALBUMIN/GLOB SERPL: 1.1 (ref 1.1–2.2)
ALP SERPL-CCNC: 92 U/L (ref 45–117)
ALT SERPL-CCNC: 21 U/L (ref 12–78)
ANION GAP SERPL CALC-SCNC: 7 MMOL/L (ref 5–15)
AST SERPL-CCNC: 16 U/L (ref 15–37)
BILIRUB SERPL-MCNC: 0.7 MG/DL (ref 0.2–1)
BUN SERPL-MCNC: 9 MG/DL (ref 6–20)
BUN/CREAT SERPL: 11 (ref 12–20)
CALCIUM SERPL-MCNC: 9.6 MG/DL (ref 8.5–10.1)
CHLORIDE SERPL-SCNC: 105 MMOL/L (ref 97–108)
CO2 SERPL-SCNC: 26 MMOL/L (ref 21–32)
CREAT SERPL-MCNC: 0.84 MG/DL (ref 0.55–1.02)
EKG ATRIAL RATE: 79 BPM
EKG DIAGNOSIS: NORMAL
EKG P AXIS: 31 DEGREES
EKG P-R INTERVAL: 140 MS
EKG Q-T INTERVAL: 360 MS
EKG QRS DURATION: 74 MS
EKG QTC CALCULATION (BAZETT): 412 MS
EKG R AXIS: 61 DEGREES
EKG T AXIS: 30 DEGREES
EKG VENTRICULAR RATE: 79 BPM
GLOBULIN SER CALC-MCNC: 4 G/DL (ref 2–4)
GLUCOSE SERPL-MCNC: 141 MG/DL (ref 65–100)
POTASSIUM SERPL-SCNC: 3.5 MMOL/L (ref 3.5–5.1)
PROT SERPL-MCNC: 8.2 G/DL (ref 6.4–8.2)
SODIUM SERPL-SCNC: 138 MMOL/L (ref 136–145)
TROPONIN I SERPL HS-MCNC: <4 NG/L (ref 0–51)

## 2024-07-03 PROCEDURE — 71046 X-RAY EXAM CHEST 2 VIEWS: CPT

## 2024-07-03 NOTE — ED PROVIDER NOTES
[facility] EMERGENCY DEPT  EMERGENCY DEPARTMENT ENCOUNTER      Pt Name: Jess Saunders  MRN: [id number]  Birthdate 2000  Date of evaluation: 7/2/2024  Provider: DASH Chavarria    CHIEF COMPLAINT       Chief Complaint   Patient presents with    Headache    Dizziness         HISTORY OF PRESENT ILLNESS   (Location/Symptom, Timing/Onset, Context/Setting, Quality, Duration, Modifying Factors, Severity)  Note limiting factors.   24-year-old female history of orthostatic hypotension presenting to the ED for multiple complaints.  Patient reports that she has not been feeling well for about 3 days.  Reports headache that she notes is wholly consistent with prior headaches, notes that she has been told in the past that she likely has migraines but has never been formally diagnosed.  Patient reports frontal and also posterior headache, somewhat waxing and waning for 3 days, also with lightheadedness, mild blurred vision.  + Photophobia.  Had nausea/vomiting 2 days ago but none since.  No fever.  Patient reports that she has been having issues with her left arm and \"my heart\" \"for a couple of years\" but notes that she saw cardiology and was told that everything was okay.  Patient denies cough, congestion, sore throat.  Had chest pain earlier today as well as some palpitations.    Past medical history: As above  Surgical history: Right wrist  Social history: Denies tobacco, alcohol, drug use.  Not currently working.    The history is provided by the patient. A  was used.         Review of External Medical Records:     Nursing Notes were reviewed.    REVIEW OF SYSTEMS    (2-9 systems for level 4, 10 or more for level 5)     Review of Systems   Constitutional:  Negative for fever.   Respiratory:  Negative for shortness of breath.    Cardiovascular:  Positive for chest pain and palpitations.   Genitourinary:  Negative for dysuria.   Musculoskeletal:  Negative for neck stiffness.

## 2024-07-03 NOTE — ED NOTES
Discharge instructions reviewed with patient via . Opportunity to answer questions and provide clarification given.

## 2024-07-03 NOTE — ED TRIAGE NOTES
Pt arrives ambulatory to triage reporting three days of headache and dizziness, blurred vision, and pain in R hand. Pt stating in this has happened before when patient was living in [facility].      Tylenol last taken at 3pm

## 2024-07-03 NOTE — DISCHARGE INSTRUCTIONS
Return to the ER for new or worsening symptoms.  Thankfully, your workup tonight was overall reassuring.  Increase your fluid intake over the next couple of days.  Follow-up with your primary care.    Regrese a la piter de emergencias si los síntomas son nuevos o empeoran.  Afortunadamente, horton análisis de esta noche fue en general tranquilizador.  Aumente horton ingesta de líquidos armen los próximos días.  Gatito un seguimiento con horton atención primaria.

## 2024-07-05 ENCOUNTER — TELEPHONE (OUTPATIENT)
Age: 24
End: 2024-07-05

## 2024-07-05 NOTE — TELEPHONE ENCOUNTER
I was unable to speak with the patient, but I was able to leave a voicemail with the assistance of an . I offered the patient the 8am and 3pm appointment slot due to transportation.

## 2024-07-22 RX ORDER — FLUDROCORTISONE ACETATE 0.1 MG/1
0.1 TABLET ORAL DAILY
Qty: 90 TABLET | Refills: 0 | Status: SHIPPED | OUTPATIENT
Start: 2024-07-22

## 2024-07-22 NOTE — TELEPHONE ENCOUNTER
Pt walked in requesting refill of florinef and appt with Dr. Wong before 8/14.     Future Appointments   Date Time Provider Department Center   7/23/2024  3:00 PM Isra Cardenas MD [facility] BS AMB   7/29/2024  1:00 PM Roya Madera APRN - NP [facility] BS AMB

## 2024-07-23 ENCOUNTER — OFFICE VISIT (OUTPATIENT)
Age: 24
End: 2024-07-23

## 2024-07-23 VITALS
HEIGHT: 64 IN | BODY MASS INDEX: 19.97 KG/M2 | DIASTOLIC BLOOD PRESSURE: 63 MMHG | OXYGEN SATURATION: 98 % | RESPIRATION RATE: 18 BRPM | SYSTOLIC BLOOD PRESSURE: 94 MMHG | TEMPERATURE: 98.3 F | HEART RATE: 86 BPM | WEIGHT: 117 LBS

## 2024-07-23 DIAGNOSIS — H61.23 CERUMEN DEBRIS ON TYMPANIC MEMBRANE OF BOTH EARS: ICD-10-CM

## 2024-07-23 DIAGNOSIS — R30.0 DYSURIA: ICD-10-CM

## 2024-07-23 DIAGNOSIS — K21.9 GASTROESOPHAGEAL REFLUX DISEASE WITHOUT ESOPHAGITIS: Primary | ICD-10-CM

## 2024-07-23 DIAGNOSIS — R51.9 ACUTE INTRACTABLE HEADACHE, UNSPECIFIED HEADACHE TYPE: ICD-10-CM

## 2024-07-23 DIAGNOSIS — H93.13 TINNITUS, BILATERAL: ICD-10-CM

## 2024-07-23 LAB
BILIRUBIN, URINE, POC: NEGATIVE
BLOOD URINE, POC: NEGATIVE
GLUCOSE URINE, POC: NEGATIVE
KETONES, URINE, POC: NEGATIVE
LEUKOCYTE ESTERASE, URINE, POC: NEGATIVE
NITRITE, URINE, POC: NEGATIVE
PH, URINE, POC: 7.5 (ref 4.6–8)
PROTEIN,URINE, POC: NEGATIVE
SPECIFIC GRAVITY, URINE, POC: 1.01 (ref 1–1.03)
URINALYSIS CLARITY, POC: CLEAR
URINALYSIS COLOR, POC: YELLOW
UROBILINOGEN, POC: NORMAL

## 2024-07-23 PROCEDURE — 81003 URINALYSIS AUTO W/O SCOPE: CPT

## 2024-07-23 PROCEDURE — 99214 OFFICE O/P EST MOD 30 MIN: CPT

## 2024-07-23 RX ORDER — PROPRANOLOL HCL 60 MG
60 CAPSULE, EXTENDED RELEASE 24HR ORAL DAILY
Qty: 90 CAPSULE | Refills: 0 | Status: SHIPPED | OUTPATIENT
Start: 2024-07-23 | End: 2024-10-21

## 2024-07-23 RX ORDER — PANTOPRAZOLE SODIUM 40 MG/1
40 TABLET, DELAYED RELEASE ORAL
Qty: 56 TABLET | Refills: 0 | Status: SHIPPED | OUTPATIENT
Start: 2024-07-23 | End: 2024-09-17

## 2024-07-23 NOTE — PROGRESS NOTES
[facility] Medicine Res[facility]   [address] [place] [address]   Office [phone], Fax [phone]      Chief Complaint:     Chief Complaint   Patient presents with    Annual Exam     Patient is coming in for a physical. Patient would like to have labs. She states that she has been having random bruises around her body since 1 year ago. She states that she might have acid reflux. No other concerns.      Subjective:   HPI:  Jess Saunders is a 24 y.o. female that presents for:    Stomach pain around epigastric region for a few months  Trigger: every time she eats food  Has diarrhea every time that she eats meat  Relief: none known  No vomiting, diarrhea, fever, chills, abdominal distention, weight loss, loss of appetite  Alcohol, smoking : none  Does not take ibuprofen or motrin    Headache  Duration: 2 months, lasts for 4 days, has these episodes frequently  Associated with blurring of vision and vomiting   Triggers: when she doesn't take tylenol  Relief: Tylenol  Hasn't seen a optometrist  Also has bilateral aural fullness.    Has increased frequency of urination and burning sensation while urination for the last couple of days, no fever, chills no flank pain no hematuria.    Please note that this dictation was completed with Eved, the [employer]oScope voice recognition software.  Quite often unanticipated grammatical, syntax, homophones, and other interpretive errors are inadvertently transcribed by the computer software.  Please disregard these errors.  Please excuse any errors that have escaped final proofreading.     Past medical history, social history, medications, and allergies personally reviewed.      Medications:   Current Outpatient Medications   Medication Sig    pantoprazole (PROTONIX) 40 MG tablet Take 1 tablet by mouth every morning (before breakfast)    propranolol (INDERAL LA) 60 MG extended release capsule Take 1

## 2024-07-23 NOTE — PROGRESS NOTES
Jess Saunders is a 24 y.o. female      Chief Complaint   Patient presents with    Annual Exam     Patient is coming in for a physical. Patient would like to have labs. She states that she has been having random bruises around her body since 1 year ago. She states that she might have acid reflux. No other concerns.        \"Have you been to the ER, urgent care clinic since your last visit?  Hospitalized since your last visit?\"    NO    “Have you seen or consulted any other health care providers outside of [facility] since your last visit?”    NO              Vitals:    07/23/24 1423 07/23/24 1440   BP: (!) 86/50 94/63   Site: Right Upper Arm Left Upper Arm   Position: Sitting Sitting   Pulse: 86    Resp: 18    Temp: 98.3 °F (36.8 °C)    TempSrc: Oral    SpO2: 98%    Weight: 53.1 kg (117 lb)    Height: 1.626 m (5' 4\")             Health Maintenance Due   Topic Date Due    Hepatitis B vaccine (1 of 3 - 3-dose series) Never done    COVID-19 Vaccine (1) Never done    Varicella vaccine (1 of 2 - 2-dose childhood series) Never done    HPV vaccine (1 - 2-dose series) Never done         Medication Reconciliation completed, changes noted.  Please  Update medication list.

## 2024-07-28 LAB
H PYLORI AG STL QL IA: POSITIVE
SPECIMEN SOURCE: ABNORMAL

## 2024-07-29 ENCOUNTER — OFFICE VISIT (OUTPATIENT)
Age: 24
End: 2024-07-29

## 2024-07-29 VITALS
DIASTOLIC BLOOD PRESSURE: 62 MMHG | HEIGHT: 64 IN | BODY MASS INDEX: 19.97 KG/M2 | SYSTOLIC BLOOD PRESSURE: 92 MMHG | HEART RATE: 60 BPM | WEIGHT: 117 LBS

## 2024-07-29 DIAGNOSIS — A04.8 H. PYLORI INFECTION: ICD-10-CM

## 2024-07-29 DIAGNOSIS — R07.9 CHEST PAIN, UNSPECIFIED TYPE: ICD-10-CM

## 2024-07-29 DIAGNOSIS — R00.2 HEART PALPITATIONS: Primary | ICD-10-CM

## 2024-07-29 DIAGNOSIS — I95.0 IDIOPATHIC HYPOTENSION: ICD-10-CM

## 2024-07-29 DIAGNOSIS — A04.8 H. PYLORI INFECTION: Primary | ICD-10-CM

## 2024-07-29 PROCEDURE — 99214 OFFICE O/P EST MOD 30 MIN: CPT | Performed by: NURSE PRACTITIONER

## 2024-07-29 RX ORDER — PANTOPRAZOLE SODIUM 40 MG/1
40 TABLET, DELAYED RELEASE ORAL 2 TIMES DAILY
Qty: 90 TABLET | Refills: 1 | Status: SHIPPED | OUTPATIENT
Start: 2024-07-29 | End: 2024-07-29

## 2024-07-29 RX ORDER — CLARITHROMYCIN 500 MG/1
500 TABLET, COATED ORAL 2 TIMES DAILY
Qty: 28 TABLET | Refills: 0 | Status: SHIPPED | OUTPATIENT
Start: 2024-07-29 | End: 2024-07-29

## 2024-07-29 RX ORDER — CLARITHROMYCIN 500 MG/1
500 TABLET, COATED ORAL 2 TIMES DAILY
COMMUNITY
End: 2024-08-01 | Stop reason: SDUPTHER

## 2024-07-29 RX ORDER — AMOXICILLIN 500 MG/1
500 CAPSULE ORAL 2 TIMES DAILY
Qty: 14 CAPSULE | Refills: 0 | Status: SHIPPED | OUTPATIENT
Start: 2024-07-29 | End: 2024-07-29

## 2024-07-29 RX ORDER — AMOXICILLIN 500 MG/1
500 CAPSULE ORAL 2 TIMES DAILY
COMMUNITY
End: 2024-08-01 | Stop reason: SDUPTHER

## 2024-07-29 RX ORDER — FLUDROCORTISONE ACETATE 0.1 MG/1
0.1 TABLET ORAL 2 TIMES DAILY
Qty: 60 TABLET | Refills: 1 | Status: SHIPPED | OUTPATIENT
Start: 2024-07-29

## 2024-07-29 NOTE — PATIENT INSTRUCTIONS
Continue tomando propanolol en la noche para que ayude a controlar noemi palpitaciones.    Por favor tome Fludrocortisone 0.1 mg antes del desayuno y despues del almuerzo.    Por favor continue Pantoprazolo 40 mg diariamente y comienze a amrita los  antibioticos ordenados por el Dr. Cardenas para horton estomago.

## 2024-07-29 NOTE — PROGRESS NOTES
test 1/24 did not show any evidence of ischemia, symptoms may be related to GERD and H Pylori infection, she will follow up here again in 6 weeks after treatment for GERD and H Pylori to see how her symptoms are doing    3.  GERD/H Pylori: advised her to continue PPI, begin antibiotics as Rx'd by PCP and follow up with PCP       ELIZABETH     Very pleasant 24 years old female with orthostatic hypotension   no history of hypertension hyperlipidemia diabetes congenital heart disease that she knows of.  She does not drink or smoke. Currently not working or studying.  Tilt table test on March 2000 showed orthostatic hypotension with a reflex tachycardia.    CARDIAC STUDIES      08/23/23    ECHO (TTE) COMPLETE (CONTRAST/BUBBLE/3D PRN) 08/23/2023  5:32 PM (Final)    Interpretation Summary    Left Ventricle: Low normal left ventricular systolic function with a visually estimated EF of 50 - 55%. Left ventricle size is normal. Normal wall thickness. Normal wall motion. Normal diastolic function.    Tricuspid Valve: Mild regurgitation. Normal RVSP. The estimated RVSP is 20 mmHg.    Pulmonic Valve: Mild regurgitation.    Left Atrium: Left atrium is smaller than normal. LA Vol Index A/L is 11 mL/m2.    Signed by: Stephen Wong MD on 8/23/2023  5:32 PM    01/22/24    STRESS TEST ONLY EXERCISE 01/23/2024  3:39 PM (Final)    Interpretation Summary    ECG: The ECG was negative for ischemia.    Stress Test: A Sandor protocol stress test was performed. Overall, the patient's exercise capacity was above average for their age. The patient reached stage 4 of the protocol and was stressed for 10 min and 0 sec. The patient experienced no angina during the test. Hemodynamics are adequate for diagnosis. Blood pressure demonstrated a normal response and heart rate demonstrated a normal response to stress. The patient's heart rate recovery was normal.    Stress ECG: The ECG was negative for ischemia.    Signed by: Stephen Wong MD on 1/23/2024

## 2024-07-31 ENCOUNTER — TELEPHONE (OUTPATIENT)
Age: 24
End: 2024-07-31

## 2024-07-31 NOTE — TELEPHONE ENCOUNTER
Hi Dr. Cardenas,    Patient stated that she was never able to  her medication amoxicillin (AMOXIL) 500 MG capsule and clarithromycin (BIAXIN) 500 MG tablet  when she went to the pharmacy and they stated they never received prescriptions. She asked if you can resend both medication to the pharmacy.      [facility] [address] [place], [place] - [address] [place] [address] - P [phone] - F [phone]  [address] [place] [address]  Phone: [phone]  Fax: [phone]

## 2024-08-01 ENCOUNTER — OFFICE VISIT (OUTPATIENT)
Age: 24
End: 2024-08-01

## 2024-08-01 VITALS
HEART RATE: 60 BPM | OXYGEN SATURATION: 99 % | HEIGHT: 64 IN | RESPIRATION RATE: 16 BRPM | WEIGHT: 117 LBS | DIASTOLIC BLOOD PRESSURE: 76 MMHG | SYSTOLIC BLOOD PRESSURE: 117 MMHG | TEMPERATURE: 98.6 F | BODY MASS INDEX: 19.97 KG/M2

## 2024-08-01 DIAGNOSIS — A04.8 H. PYLORI INFECTION: ICD-10-CM

## 2024-08-01 DIAGNOSIS — A04.8 H. PYLORI INFECTION: Primary | ICD-10-CM

## 2024-08-01 DIAGNOSIS — R10.13 EPIGASTRIC ABDOMINAL PAIN: Primary | ICD-10-CM

## 2024-08-01 RX ORDER — CLARITHROMYCIN 500 MG/1
500 TABLET, COATED ORAL 2 TIMES DAILY
Qty: 28 TABLET | Refills: 0 | Status: SHIPPED | OUTPATIENT
Start: 2024-08-01 | End: 2024-08-15

## 2024-08-01 RX ORDER — AMOXICILLIN 500 MG/1
500 CAPSULE ORAL 2 TIMES DAILY
Qty: 28 CAPSULE | Refills: 0 | Status: SHIPPED | OUTPATIENT
Start: 2024-08-01 | End: 2024-08-15

## 2024-08-01 NOTE — PROGRESS NOTES
Jess Saunders (:  2000) is a 24 y.o. female,New patient, here for evaluation of the following chief complaint(s):  Abdominal Pain (Pt c/o stomach pain and that she was diagnosed with h pylori has not been taking prescribed medication. Pt dx with h pyloroi 24.)    TRIAGE VISIT ONLY      SUBJECTIVE/OBJECTIVE:    History provided by:  Patient       24 y.o. female presents with symptoms of abdominal pain. Patient seen with  via iPad, Kyle. Patient states was recently diagnosed with H. Pylori infection earlier this month. She did not receive the medications and called her doctor to get them sent to the pharmacy again. States last three days pain has worsened, is worse with eating. Pain is about 4/10. No fevers, chills. No vomiting. Admits to black stool 3 days ago.    On chart review it appears her medications we sent to the pharmacy again today. Patient aware. Here because having worsening pain and black stool.           Vitals:    24 1456   BP: 117/76   Site: Right Upper Arm   Position: Sitting   Cuff Size: Medium Adult   Pulse: 60   Resp: 16   Temp: 98.6 °F (37 °C)   TempSrc: Oral   SpO2: 99%   Weight: 53.1 kg (117 lb)   Height: 1.626 m (5' 4\")       No results found for this visit on 24.     Physical Exam  Constitutional:       General: She is not in acute distress.     Appearance: Normal appearance. She is normal weight. She is not ill-appearing, toxic-appearing or diaphoretic.   HENT:      Head: Normocephalic and atraumatic.   Cardiovascular:      Rate and Rhythm: Normal rate and regular rhythm.      Heart sounds: No murmur heard.     No friction rub. No gallop.   Pulmonary:      Effort: Pulmonary effort is normal. No respiratory distress.      Breath sounds: Normal breath sounds. No wheezing, rhonchi or rales.   Abdominal:      General: Abdomen is flat. Bowel sounds are normal. There is no distension.      Palpations: Abdomen is soft.

## 2024-08-01 NOTE — PATIENT INSTRUCTIONS
History of recently diagnosed H.Pylori with worsening abdominal pain and black stools -  Recommend evaluation in emergency center:  [facility]  [address], [place]

## 2024-08-07 ENCOUNTER — HOSPITAL ENCOUNTER (EMERGENCY)
Facility: HOSPITAL | Age: 24
Discharge: HOME | End: 2024-08-07
Attending: EMERGENCY MEDICINE

## 2024-08-07 VITALS
HEART RATE: 82 BPM | HEIGHT: 64 IN | BODY MASS INDEX: 17.58 KG/M2 | OXYGEN SATURATION: 99 % | DIASTOLIC BLOOD PRESSURE: 77 MMHG | SYSTOLIC BLOOD PRESSURE: 101 MMHG | RESPIRATION RATE: 16 BRPM | TEMPERATURE: 97.8 F | WEIGHT: 103 LBS

## 2024-08-07 DIAGNOSIS — K29.70 HELICOBACTER PYLORI GASTRITIS: ICD-10-CM

## 2024-08-07 DIAGNOSIS — K29.70 GASTRITIS, PRESENCE OF BLEEDING UNSPECIFIED, UNSPECIFIED CHRONICITY, UNSPECIFIED GASTRITIS TYPE: Primary | ICD-10-CM

## 2024-08-07 DIAGNOSIS — B96.81 HELICOBACTER PYLORI GASTRITIS: ICD-10-CM

## 2024-08-07 LAB
ALBUMIN SERPL-MCNC: 4.2 G/DL (ref 3.5–5)
ALBUMIN/GLOB SERPL: 0.9 (ref 1.1–2.2)
ALP SERPL-CCNC: 93 U/L (ref 45–117)
ALT SERPL-CCNC: 27 U/L (ref 12–78)
ANION GAP SERPL CALC-SCNC: 6 MMOL/L (ref 5–15)
APPEARANCE UR: CLEAR
AST SERPL-CCNC: 23 U/L (ref 15–37)
BACTERIA URNS QL MICRO: NEGATIVE /HPF
BASOPHILS # BLD: 0.1 K/UL (ref 0–0.1)
BASOPHILS NFR BLD: 1 % (ref 0–1)
BILIRUB SERPL-MCNC: 0.8 MG/DL (ref 0.2–1)
BILIRUB UR QL: NEGATIVE
BUN SERPL-MCNC: 15 MG/DL (ref 6–20)
BUN/CREAT SERPL: 20 (ref 12–20)
CALCIUM SERPL-MCNC: 9.3 MG/DL (ref 8.5–10.1)
CHLORIDE SERPL-SCNC: 106 MMOL/L (ref 97–108)
CO2 SERPL-SCNC: 23 MMOL/L (ref 21–32)
COLOR UR: ABNORMAL
CREAT SERPL-MCNC: 0.76 MG/DL (ref 0.55–1.02)
DIFFERENTIAL METHOD BLD: ABNORMAL
EOSINOPHIL # BLD: 0.2 K/UL (ref 0–0.4)
EOSINOPHIL NFR BLD: 2 % (ref 0–7)
EPITH CASTS URNS QL MICRO: ABNORMAL /LPF
ERYTHROCYTE [DISTWIDTH] IN BLOOD BY AUTOMATED COUNT: 12.5 % (ref 11.5–14.5)
GLOBULIN SER CALC-MCNC: 4.5 G/DL (ref 2–4)
GLUCOSE SERPL-MCNC: 117 MG/DL (ref 65–100)
GLUCOSE UR STRIP.AUTO-MCNC: NEGATIVE MG/DL
HCT VFR BLD AUTO: 43.4 % (ref 35–47)
HGB BLD-MCNC: 15.2 G/DL (ref 11.5–16)
HGB UR QL STRIP: ABNORMAL
HYALINE CASTS URNS QL MICRO: ABNORMAL /LPF (ref 0–2)
IMM GRANULOCYTES # BLD AUTO: 0 K/UL (ref 0–0.04)
IMM GRANULOCYTES NFR BLD AUTO: 0 % (ref 0–0.5)
KETONES UR QL STRIP.AUTO: NEGATIVE MG/DL
LEUKOCYTE ESTERASE UR QL STRIP.AUTO: NEGATIVE
LIPASE SERPL-CCNC: 47 U/L (ref 13–75)
LYMPHOCYTES # BLD: 3.2 K/UL (ref 0.8–3.5)
LYMPHOCYTES NFR BLD: 40 % (ref 12–49)
MCH RBC QN AUTO: 28.7 PG (ref 26–34)
MCHC RBC AUTO-ENTMCNC: 35 G/DL (ref 30–36.5)
MCV RBC AUTO: 81.9 FL (ref 80–99)
MONOCYTES # BLD: 0.9 K/UL (ref 0–1)
MONOCYTES NFR BLD: 11 % (ref 5–13)
NEUTS SEG # BLD: 3.7 K/UL (ref 1.8–8)
NEUTS SEG NFR BLD: 46 % (ref 32–75)
NITRITE UR QL STRIP.AUTO: NEGATIVE
NRBC # BLD: 0 K/UL (ref 0–0.01)
NRBC BLD-RTO: 0 PER 100 WBC
PH UR STRIP: 6 (ref 5–8)
PLATELET # BLD AUTO: 214 K/UL (ref 150–400)
PMV BLD AUTO: 10.3 FL (ref 8.9–12.9)
POTASSIUM SERPL-SCNC: 3.7 MMOL/L (ref 3.5–5.1)
PROT SERPL-MCNC: 8.7 G/DL (ref 6.4–8.2)
PROT UR STRIP-MCNC: NEGATIVE MG/DL
RBC # BLD AUTO: 5.3 M/UL (ref 3.8–5.2)
RBC #/AREA URNS HPF: ABNORMAL /HPF (ref 0–5)
SODIUM SERPL-SCNC: 135 MMOL/L (ref 136–145)
SP GR UR REFRACTOMETRY: 1.01 (ref 1–1.03)
URINE CULTURE IF INDICATED: ABNORMAL
UROBILINOGEN UR QL STRIP.AUTO: 0.2 EU/DL (ref 0.2–1)
WBC # BLD AUTO: 8.1 K/UL (ref 3.6–11)
WBC URNS QL MICRO: ABNORMAL /HPF (ref 0–4)

## 2024-08-07 PROCEDURE — 2580000003 HC RX 258: Performed by: STUDENT IN AN ORGANIZED HEALTH CARE EDUCATION/TRAINING PROGRAM

## 2024-08-07 PROCEDURE — 81025 URINE PREGNANCY TEST: CPT

## 2024-08-07 PROCEDURE — 36415 COLL VENOUS BLD VENIPUNCTURE: CPT

## 2024-08-07 PROCEDURE — 6360000002 HC RX W HCPCS: Performed by: STUDENT IN AN ORGANIZED HEALTH CARE EDUCATION/TRAINING PROGRAM

## 2024-08-07 PROCEDURE — 80053 COMPREHEN METABOLIC PANEL: CPT

## 2024-08-07 PROCEDURE — 81001 URINALYSIS AUTO W/SCOPE: CPT

## 2024-08-07 PROCEDURE — 83690 ASSAY OF LIPASE: CPT

## 2024-08-07 PROCEDURE — 6370000000 HC RX 637 (ALT 250 FOR IP): Performed by: STUDENT IN AN ORGANIZED HEALTH CARE EDUCATION/TRAINING PROGRAM

## 2024-08-07 PROCEDURE — 96374 THER/PROPH/DIAG INJ IV PUSH: CPT

## 2024-08-07 PROCEDURE — 93005 ELECTROCARDIOGRAM TRACING: CPT | Performed by: EMERGENCY MEDICINE

## 2024-08-07 PROCEDURE — 99284 EMERGENCY DEPT VISIT MOD MDM: CPT

## 2024-08-07 PROCEDURE — 85025 COMPLETE CBC W/AUTO DIFF WBC: CPT

## 2024-08-07 RX ORDER — SUCRALFATE 1 G/1
1 TABLET ORAL 4 TIMES DAILY
Qty: 120 TABLET | Refills: 0 | Status: SHIPPED | OUTPATIENT
Start: 2024-08-07

## 2024-08-07 RX ORDER — SUCRALFATE 1 G/1
1 TABLET ORAL
Status: COMPLETED | OUTPATIENT
Start: 2024-08-07 | End: 2024-08-07

## 2024-08-07 RX ADMIN — PANTOPRAZOLE SODIUM 40 MG: 40 INJECTION, POWDER, FOR SOLUTION INTRAVENOUS at 17:44

## 2024-08-07 RX ADMIN — ALUMINUM HYDROXIDE, MAGNESIUM HYDROXIDE, AND SIMETHICONE 40 ML: 1200; 120; 1200 SUSPENSION ORAL at 17:44

## 2024-08-07 RX ADMIN — SUCRALFATE 1 G: 1 TABLET ORAL at 17:44

## 2024-08-07 ASSESSMENT — ENCOUNTER SYMPTOMS
BLOOD IN STOOL: 0
WHEEZING: 0
DIARRHEA: 0
SORE THROAT: 0
RECTAL PAIN: 0
COUGH: 0
NAUSEA: 0
COLOR CHANGE: 0
ABDOMINAL DISTENTION: 0
ABDOMINAL PAIN: 1
SHORTNESS OF BREATH: 0
BACK PAIN: 0
SINUS PAIN: 0
EYE DISCHARGE: 0
VOMITING: 0
ANAL BLEEDING: 0
CONSTIPATION: 0
RHINORRHEA: 0

## 2024-08-07 ASSESSMENT — PAIN - FUNCTIONAL ASSESSMENT: PAIN_FUNCTIONAL_ASSESSMENT: 0-10

## 2024-08-07 ASSESSMENT — PAIN DESCRIPTION - LOCATION: LOCATION: ABDOMEN

## 2024-08-07 ASSESSMENT — PAIN SCALES - GENERAL: PAINLEVEL_OUTOF10: 5

## 2024-08-07 NOTE — ED TRIAGE NOTES
Pt arrives to ED POV, ambulatory to triage,  present in triage. Chief complaint of abdominal pain since July 29th located in mid/upper epigastric as well as the lower abdomen. Denies nausea/vomiting. Pt was seen yesterday at \"[facility]\" was told to come to ED. Reports palpitations, weakness, and \"spasms on her heart\". Also states pain worsens after eating.    Pt additionally reports diarrhea and decrease urine output.     Hx: hypotension, h-pylori (currently taking antibiotics)

## 2024-08-07 NOTE — DISCHARGE INSTRUCTIONS
Continúe tomando los medicamentos recetados. [place] carafato para ayudar a aliviar los síntomas. Siga las recomendaciones dietéticas proporcionadas. Por favor cony un seguimiento con horton médico de atención primaria.

## 2024-08-07 NOTE — ED PROVIDER NOTES
also given a referral to GI.  Strict return precautions were discussed in detail.  All questions were answered.    Amount and/or Complexity of Data Reviewed  Labs: ordered. Decision-making details documented in ED Course.  ECG/medicine tests: ordered.    Risk  Prescription drug management.            REASSESSMENT     ED Course as of 08/07/24 1702   Wed Aug 07, 2024   1652 Hemoglobin Quant: 15.2 [KG]   1652 Hematocrit: 43.4 [KG]      ED Course User Index  [KG] Princess Willis PA           CONSULTS:  None    PROCEDURES:  Unless otherwise noted below, none     Procedures      FINAL IMPRESSION      1. Gastritis, presence of bleeding unspecified, unspecified chronicity, unspecified gastritis type    2. Helicobacter pylori gastritis          DISPOSITION/PLAN   DISPOSITION Decision To Discharge 08/07/2024 04:59:12 PM      PATIENT REFERRED TO:  Isra Cardenas MD  [address] [facility] [address] [phone]    Schedule an appointment as soon as possible for a visit       Marino Pena MD  [address] [address] [phone]    Schedule an appointment as soon as possible for a visit         DISCHARGE MEDICATIONS:  New Prescriptions    SUCRALFATE (CARAFATE) 1 GM TABLET    Take 1 tablet by mouth 4 times daily         (Please note that portions of this note were completed with a voice recognition program.  Efforts were made to edit the dictations but occasionally words are mis-transcribed.)    DASH Reynoso (electronically signed)  Physician Assistant        Princess Willis PA  08/07/24 1702

## 2024-08-08 LAB
EKG ATRIAL RATE: 71 BPM
EKG DIAGNOSIS: NORMAL
EKG P AXIS: 7 DEGREES
EKG P-R INTERVAL: 134 MS
EKG Q-T INTERVAL: 356 MS
EKG QRS DURATION: 76 MS
EKG QTC CALCULATION (BAZETT): 386 MS
EKG R AXIS: 66 DEGREES
EKG T AXIS: 30 DEGREES
EKG VENTRICULAR RATE: 71 BPM
HCG UR QL: NEGATIVE

## 2024-08-08 PROCEDURE — 93010 ELECTROCARDIOGRAM REPORT: CPT | Performed by: SPECIALIST

## 2024-08-16 ENCOUNTER — APPOINTMENT (OUTPATIENT)
Facility: HOSPITAL | Age: 24
End: 2024-08-16

## 2024-08-16 ENCOUNTER — HOSPITAL ENCOUNTER (EMERGENCY)
Facility: HOSPITAL | Age: 24
Discharge: HOME | End: 2024-08-16
Attending: EMERGENCY MEDICINE

## 2024-08-16 VITALS
DIASTOLIC BLOOD PRESSURE: 68 MMHG | HEART RATE: 62 BPM | SYSTOLIC BLOOD PRESSURE: 98 MMHG | OXYGEN SATURATION: 99 % | HEIGHT: 60 IN | RESPIRATION RATE: 16 BRPM | BODY MASS INDEX: 22.46 KG/M2 | WEIGHT: 114.42 LBS | TEMPERATURE: 98.1 F

## 2024-08-16 DIAGNOSIS — N30.01 ACUTE CYSTITIS WITH HEMATURIA: ICD-10-CM

## 2024-08-16 DIAGNOSIS — R10.84 GENERALIZED ABDOMINAL PAIN: Primary | ICD-10-CM

## 2024-08-16 LAB
ALBUMIN SERPL-MCNC: 4.1 G/DL (ref 3.5–5)
ALBUMIN/GLOB SERPL: 1.1 (ref 1.1–2.2)
ALP SERPL-CCNC: 87 U/L (ref 45–117)
ALT SERPL-CCNC: 35 U/L (ref 12–78)
ANION GAP SERPL CALC-SCNC: 5 MMOL/L (ref 5–15)
APPEARANCE UR: CLEAR
AST SERPL-CCNC: 21 U/L (ref 15–37)
BACTERIA URNS QL MICRO: NEGATIVE /HPF
BASOPHILS # BLD: 0.1 K/UL (ref 0–0.1)
BASOPHILS NFR BLD: 1 % (ref 0–1)
BILIRUB SERPL-MCNC: 0.6 MG/DL (ref 0.2–1)
BILIRUB UR QL: NEGATIVE
BUN SERPL-MCNC: 21 MG/DL (ref 6–20)
BUN/CREAT SERPL: 21 (ref 12–20)
CALCIUM SERPL-MCNC: 9.4 MG/DL (ref 8.5–10.1)
CHLORIDE SERPL-SCNC: 106 MMOL/L (ref 97–108)
CO2 SERPL-SCNC: 26 MMOL/L (ref 21–32)
COLOR UR: ABNORMAL
CREAT SERPL-MCNC: 1 MG/DL (ref 0.55–1.02)
DIFFERENTIAL METHOD BLD: NORMAL
EOSINOPHIL # BLD: 0.2 K/UL (ref 0–0.4)
EOSINOPHIL NFR BLD: 2 % (ref 0–7)
EPITH CASTS URNS QL MICRO: ABNORMAL /LPF
ERYTHROCYTE [DISTWIDTH] IN BLOOD BY AUTOMATED COUNT: 12.7 % (ref 11.5–14.5)
GLOBULIN SER CALC-MCNC: 3.8 G/DL (ref 2–4)
GLUCOSE SERPL-MCNC: 134 MG/DL (ref 65–100)
GLUCOSE UR STRIP.AUTO-MCNC: NEGATIVE MG/DL
HCG UR QL: NEGATIVE
HCT VFR BLD AUTO: 40.1 % (ref 35–47)
HGB BLD-MCNC: 13.9 G/DL (ref 11.5–16)
HGB UR QL STRIP: ABNORMAL
HYALINE CASTS URNS QL MICRO: ABNORMAL /LPF (ref 0–2)
IMM GRANULOCYTES # BLD AUTO: 0 K/UL (ref 0–0.04)
IMM GRANULOCYTES NFR BLD AUTO: 0 % (ref 0–0.5)
KETONES UR QL STRIP.AUTO: NEGATIVE MG/DL
LEUKOCYTE ESTERASE UR QL STRIP.AUTO: ABNORMAL
LIPASE SERPL-CCNC: 39 U/L (ref 13–75)
LYMPHOCYTES # BLD: 3.5 K/UL (ref 0.8–3.5)
LYMPHOCYTES NFR BLD: 39 % (ref 12–49)
MCH RBC QN AUTO: 28.7 PG (ref 26–34)
MCHC RBC AUTO-ENTMCNC: 34.7 G/DL (ref 30–36.5)
MCV RBC AUTO: 82.9 FL (ref 80–99)
MONOCYTES # BLD: 0.9 K/UL (ref 0–1)
MONOCYTES NFR BLD: 10 % (ref 5–13)
NEUTS SEG # BLD: 4.4 K/UL (ref 1.8–8)
NEUTS SEG NFR BLD: 48 % (ref 32–75)
NITRITE UR QL STRIP.AUTO: NEGATIVE
NRBC # BLD: 0 K/UL (ref 0–0.01)
NRBC BLD-RTO: 0 PER 100 WBC
PH UR STRIP: 5.5 (ref 5–8)
PLATELET # BLD AUTO: 308 K/UL (ref 150–400)
PMV BLD AUTO: 9.8 FL (ref 8.9–12.9)
POTASSIUM SERPL-SCNC: 3.9 MMOL/L (ref 3.5–5.1)
PROT SERPL-MCNC: 7.9 G/DL (ref 6.4–8.2)
PROT UR STRIP-MCNC: NEGATIVE MG/DL
RBC # BLD AUTO: 4.84 M/UL (ref 3.8–5.2)
RBC #/AREA URNS HPF: ABNORMAL /HPF (ref 0–5)
SODIUM SERPL-SCNC: 137 MMOL/L (ref 136–145)
SP GR UR REFRACTOMETRY: 1.02 (ref 1–1.03)
URINE CULTURE IF INDICATED: ABNORMAL
UROBILINOGEN UR QL STRIP.AUTO: 0.2 EU/DL (ref 0.2–1)
WBC # BLD AUTO: 9 K/UL (ref 3.6–11)
WBC URNS QL MICRO: ABNORMAL /HPF (ref 0–4)

## 2024-08-16 PROCEDURE — 85025 COMPLETE CBC W/AUTO DIFF WBC: CPT

## 2024-08-16 PROCEDURE — 83690 ASSAY OF LIPASE: CPT

## 2024-08-16 PROCEDURE — 6370000000 HC RX 637 (ALT 250 FOR IP)

## 2024-08-16 PROCEDURE — 74177 CT ABD & PELVIS W/CONTRAST: CPT

## 2024-08-16 PROCEDURE — 80053 COMPREHEN METABOLIC PANEL: CPT

## 2024-08-16 PROCEDURE — 99285 EMERGENCY DEPT VISIT HI MDM: CPT

## 2024-08-16 PROCEDURE — 2580000003 HC RX 258

## 2024-08-16 PROCEDURE — 81001 URINALYSIS AUTO W/SCOPE: CPT

## 2024-08-16 PROCEDURE — 36415 COLL VENOUS BLD VENIPUNCTURE: CPT

## 2024-08-16 PROCEDURE — 6360000004 HC RX CONTRAST MEDICATION: Performed by: EMERGENCY MEDICINE

## 2024-08-16 PROCEDURE — 81025 URINE PREGNANCY TEST: CPT

## 2024-08-16 RX ORDER — 0.9 % SODIUM CHLORIDE 0.9 %
1000 INTRAVENOUS SOLUTION INTRAVENOUS ONCE
Status: COMPLETED | OUTPATIENT
Start: 2024-08-16 | End: 2024-08-16

## 2024-08-16 RX ORDER — CEPHALEXIN 500 MG/1
500 CAPSULE ORAL 2 TIMES DAILY
Qty: 10 CAPSULE | Refills: 0 | Status: SHIPPED | OUTPATIENT
Start: 2024-08-16 | End: 2024-08-21

## 2024-08-16 RX ADMIN — SODIUM CHLORIDE 1000 ML: 9 INJECTION, SOLUTION INTRAVENOUS at 15:36

## 2024-08-16 RX ADMIN — ALUMINUM HYDROXIDE, MAGNESIUM HYDROXIDE, AND SIMETHICONE 40 ML: 1200; 120; 1200 SUSPENSION ORAL at 17:16

## 2024-08-16 RX ADMIN — IOPAMIDOL 100 ML: 755 INJECTION, SOLUTION INTRAVENOUS at 16:47

## 2024-08-16 ASSESSMENT — PAIN DESCRIPTION - LOCATION
LOCATION: ABDOMEN;HEAD

## 2024-08-16 ASSESSMENT — PAIN - FUNCTIONAL ASSESSMENT: PAIN_FUNCTIONAL_ASSESSMENT: 0-10

## 2024-08-16 ASSESSMENT — PAIN SCALES - GENERAL
PAINLEVEL_OUTOF10: 6

## 2024-08-16 NOTE — ED PROVIDER NOTES
[facility] EMERGENCY DEPT  EMERGENCY DEPARTMENT ENCOUNTER      Pt Name: Jess Saunders  MRN: [id number]  Birthdate 2000  Date of evaluation: 8/16/2024  Provider: Cody Miranda PA-C    CHIEF COMPLAINT       Chief Complaint   Patient presents with    Abdominal Pain    Dizziness    Headache         HISTORY OF PRESENT ILLNESS   (Location/Symptom, Timing/Onset, Context/Setting, Quality, Duration, Modifying Factors, Severity)  Note limiting factors.   24-year-old female with recent H. pylori diagnosis and treatment, seen in this ED 9 days ago for gastritis, reports to ED with increasing abdominal pain and black stool over the past few days.  Finished antibiotics for H. pylori a few days ago, 14-day treatment.  Endorses some diarrhea and slight increase in pain with urination.  Denies red blood in stool, fever, chest pain, or shortness of breath.  States increase in pain shortly after eating.              Review of External Medical Records:     Nursing Notes were reviewed.    REVIEW OF SYSTEMS    (2-9 systems for level 4, 10 or more for level 5)     Review of Systems    Except as noted above the remainder of the review of systems was reviewed and negative.       PAST MEDICAL HISTORY     Past Medical History:   Diagnosis Date    Orthostatic hypotension          SURGICAL HISTORY     No past surgical history on file.      CURRENT MEDICATIONS       Discharge Medication List as of 8/16/2024  5:55 PM        CONTINUE these medications which have NOT CHANGED    Details   sucralfate (CARAFATE) 1 GM tablet Take 1 tablet by mouth 4 times daily, Disp-120 tablet, R-0Normal      Pantoprazole Sodium (PROTONIX PO) Take 40 mg by mouth dailyHistorical Med      fludrocortisone (FLORINEF) 0.1 MG tablet Take 1 tablet by mouth 2 times daily, Disp-60 tablet, R-1Normal      propranolol (INDERAL LA) 60 MG extended release capsule Take 1 capsule by mouth daily, Disp-90 capsule, R-0Normal             ALLERGIES            CONSULTS:  None    PROCEDURES:  Unless otherwise noted below, none     Procedures      FINAL IMPRESSION      1. Generalized abdominal pain    2. Acute cystitis with hematuria          DISPOSITION/PLAN   DISPOSITION Decision To Discharge 08/16/2024 05:28:21 PM      PATIENT REFERRED TO:  Isra Cardenas MD  [address] [facility] [address] [phone]    Schedule an appointment as soon as possible for a visit       Marino Pena MD  [address] [facility] [address]  [facility] [address] [phone]    Schedule an appointment as soon as possible for a visit   Gastroenterology    [facility] EMERGENCY DEPT  [address] [place] [id number] [phone]    As needed, If symptoms worsen      DISCHARGE MEDICATIONS:  Discharge Medication List as of 8/16/2024  5:55 PM        START taking these medications    Details   cephALEXin (KEFLEX) 500 MG capsule Take 1 capsule by mouth 2 times daily for 5 days, Disp-10 capsule, R-0Normal               (Please note that portions of this note were completed with a voice recognition program.  Efforts were made to edit the dictations but occasionally words are mis-transcribed.)    Cody Miranda PA-C (electronically signed)  Emergency Attending Physician / Physician Assistant / Nurse Practitioner              Cody Miranda PA-C  08/16/24 8068

## 2024-08-16 NOTE — ED NOTES
Patient discharged by Ruben Guthrie pt sent to the front lobby, with strong and steady gait, no acute distress noted at time of discharge - Discharge information / home RX / and reasons to return to the ED were reviewed by the ED provider.

## 2024-08-16 NOTE — DISCHARGE INSTRUCTIONS
No encontramos ninguna anomalía ni análisis preocupante hoandrew, marita con noemi síntomas y hallazgos de laboratorio, es posible que esté experimentando skylar infección del tracto urinario de bajo mary.  Estamos enviando skylar receta de un antibiótico a horton farmacia.  Tómelo según lo prescrito.  Se recomienda realizar un seguimiento con horton atención primaria, así marcial con gastroenterología, Dr. Pena, información de contacto que figura aquí según sea necesario, para skylar evaluación adicional.  Continúe con noemi medicamentos recetados anteriormente.  Si los síntomas empeoran o surgen nuevos síntomas preocupantes, informe al departamento de emergencias más cercano.    Deedee por permitirnos brindarle atención médica hoandrew.  Sabemos que tiene muchas opciones para noemi necesidades de atención de emergencia.  Le agradecemos que haya elegido [facility].  Elíjanos en el futuro para cualquier necesidad continua de atención médica.     El examen y el tratamiento que recibió en el Departamento de Emergencias fueron para un problema emergente y no pretenden ser skylar atención completa. Es importante que cony un seguimiento con un médico, skylar enfermera especializada o un asistente médico para recibir atención continua. Si noemi síntomas empeoran o no mejora marcial se esperaba y no puede comunicarse con horton proveedor de atención médica habitual, debe regresar al Departamento de Emergencias.  Estamos disponibles las 24 horas del día.     Programe skylar ki con horton(s) proveedor(es) de atención médica para el seguimiento de horton visita al Departamento de Emergencias.  Lleve esta hoja con usted cuando vaya a horton visita de seguimiento.    We did not find any concerning abnormalities and workup today, but with your symptoms and lab findings, you may be experiencing a low-grade urinary tract infection.  We are sending a prescription for an antibiotic to your pharmacy.  Please take as prescribed.  It is recommended to follow-up with your primary care as well as

## 2024-08-16 NOTE — ED TRIAGE NOTES
Patient ambulatory to triage for concern for headache, burning in stomach, dizziness and dark stools since yesterday.   Patient was diagnosed with H.Pylori recently,  finished 14 day treatment.

## 2024-08-26 ENCOUNTER — HOSPITAL ENCOUNTER (EMERGENCY)
Facility: HOSPITAL | Age: 24
Discharge: HOME | End: 2024-08-26
Attending: EMERGENCY MEDICINE

## 2024-08-26 ENCOUNTER — APPOINTMENT (OUTPATIENT)
Facility: HOSPITAL | Age: 24
End: 2024-08-26

## 2024-08-26 VITALS
HEIGHT: 60 IN | DIASTOLIC BLOOD PRESSURE: 58 MMHG | RESPIRATION RATE: 17 BRPM | WEIGHT: 114 LBS | TEMPERATURE: 98.5 F | BODY MASS INDEX: 22.38 KG/M2 | SYSTOLIC BLOOD PRESSURE: 97 MMHG | OXYGEN SATURATION: 98 % | HEART RATE: 73 BPM

## 2024-08-26 DIAGNOSIS — K21.9 GASTROESOPHAGEAL REFLUX DISEASE, UNSPECIFIED WHETHER ESOPHAGITIS PRESENT: ICD-10-CM

## 2024-08-26 DIAGNOSIS — R10.13 ABDOMINAL PAIN, EPIGASTRIC: Primary | ICD-10-CM

## 2024-08-26 LAB
ALBUMIN SERPL-MCNC: 4.3 G/DL (ref 3.5–5.2)
ALBUMIN/GLOB SERPL: 1.3 (ref 1.1–2.2)
ALP SERPL-CCNC: 92 U/L (ref 35–104)
ALT SERPL-CCNC: 17 U/L (ref 10–35)
ANION GAP SERPL CALC-SCNC: 12 MMOL/L (ref 5–15)
APPEARANCE UR: CLEAR
AST SERPL-CCNC: 24 U/L (ref 10–35)
BACTERIA URNS QL MICRO: NEGATIVE /HPF
BASOPHILS # BLD: 0 K/UL (ref 0–1)
BASOPHILS NFR BLD: 0 % (ref 0–1)
BILIRUB SERPL-MCNC: 0.5 MG/DL (ref 0.2–1)
BILIRUB UR QL: NEGATIVE
BUN SERPL-MCNC: 15 MG/DL (ref 6–20)
BUN/CREAT SERPL: 27 (ref 12–20)
CALCIUM SERPL-MCNC: 9.6 MG/DL (ref 8.6–10)
CHLORIDE SERPL-SCNC: 101 MMOL/L (ref 98–107)
CO2 SERPL-SCNC: 25 MMOL/L (ref 22–29)
COLOR UR: ABNORMAL
CREAT SERPL-MCNC: 0.55 MG/DL (ref 0.5–0.9)
DIFFERENTIAL METHOD BLD: ABNORMAL
EKG ATRIAL RATE: 71 BPM
EKG DIAGNOSIS: NORMAL
EKG P AXIS: 22 DEGREES
EKG P-R INTERVAL: 146 MS
EKG Q-T INTERVAL: 364 MS
EKG QRS DURATION: 76 MS
EKG QTC CALCULATION (BAZETT): 395 MS
EKG R AXIS: 66 DEGREES
EKG T AXIS: 34 DEGREES
EKG VENTRICULAR RATE: 71 BPM
EOSINOPHIL # BLD: 0.2 K/UL (ref 0–0.4)
EOSINOPHIL NFR BLD: 2 %
EPITH CASTS URNS QL MICRO: ABNORMAL /LPF
ERYTHROCYTE [DISTWIDTH] IN BLOOD BY AUTOMATED COUNT: 12.9 % (ref 11.5–14.5)
GLOBULIN SER CALC-MCNC: 3.2 G/DL (ref 2–4)
GLUCOSE SERPL-MCNC: 107 MG/DL (ref 65–100)
GLUCOSE UR STRIP.AUTO-MCNC: NEGATIVE MG/DL
HCG UR QL: NEGATIVE
HCT VFR BLD AUTO: 41.7 % (ref 35–47)
HGB BLD-MCNC: 14 G/DL (ref 11.5–16)
HGB UR QL STRIP: ABNORMAL
IMM GRANULOCYTES # BLD AUTO: 0 K/UL (ref 0–0.04)
IMM GRANULOCYTES NFR BLD AUTO: 0 % (ref 0–0.5)
KETONES UR QL STRIP.AUTO: NEGATIVE MG/DL
LEUKOCYTE ESTERASE UR QL STRIP.AUTO: NEGATIVE
LIPASE SERPL-CCNC: 29 U/L (ref 13–60)
LYMPHOCYTES # BLD: 2.9 K/UL (ref 0.8–3.5)
LYMPHOCYTES NFR BLD: 31 % (ref 12–49)
MCH RBC QN AUTO: 28.5 PG (ref 26–34)
MCHC RBC AUTO-ENTMCNC: 33.6 G/DL (ref 30–36.5)
MCV RBC AUTO: 84.8 FL (ref 80–99)
MONOCYTES # BLD: 0.7 K/UL (ref 0–1)
MONOCYTES NFR BLD: 8 % (ref 5–13)
NEUTS SEG # BLD: 5.3 K/UL (ref 1.8–8)
NEUTS SEG NFR BLD: 59 % (ref 32–75)
NITRITE UR QL STRIP.AUTO: NEGATIVE
NRBC # BLD: 0 K/UL (ref 0–0.01)
NRBC BLD-RTO: 0 PER 100 WBC
PH UR STRIP: 5.5 (ref 5–8)
PLATELET # BLD AUTO: 239 K/UL (ref 150–400)
PMV BLD AUTO: 10.6 FL (ref 8.9–12.9)
POTASSIUM SERPL-SCNC: 3.6 MMOL/L (ref 3.5–5.1)
PROT SERPL-MCNC: 7.5 G/DL (ref 6.4–8.3)
PROT UR STRIP-MCNC: NEGATIVE MG/DL
RBC # BLD AUTO: 4.92 M/UL (ref 3.8–5.2)
RBC #/AREA URNS HPF: ABNORMAL /HPF
SODIUM SERPL-SCNC: 138 MMOL/L (ref 136–145)
SP GR UR REFRACTOMETRY: 1.01 (ref 1–1.03)
UROBILINOGEN UR QL STRIP.AUTO: 0.2 EU/DL (ref 0.2–1)
WBC # BLD AUTO: 9.2 K/UL (ref 3.6–11)
WBC URNS QL MICRO: ABNORMAL /HPF (ref 0–4)

## 2024-08-26 PROCEDURE — 81001 URINALYSIS AUTO W/SCOPE: CPT

## 2024-08-26 PROCEDURE — 81025 URINE PREGNANCY TEST: CPT

## 2024-08-26 PROCEDURE — 80053 COMPREHEN METABOLIC PANEL: CPT

## 2024-08-26 PROCEDURE — 99285 EMERGENCY DEPT VISIT HI MDM: CPT

## 2024-08-26 PROCEDURE — 71046 X-RAY EXAM CHEST 2 VIEWS: CPT

## 2024-08-26 PROCEDURE — 93005 ELECTROCARDIOGRAM TRACING: CPT | Performed by: EMERGENCY MEDICINE

## 2024-08-26 PROCEDURE — 6370000000 HC RX 637 (ALT 250 FOR IP): Performed by: EMERGENCY MEDICINE

## 2024-08-26 PROCEDURE — 83690 ASSAY OF LIPASE: CPT

## 2024-08-26 PROCEDURE — 85025 COMPLETE CBC W/AUTO DIFF WBC: CPT

## 2024-08-26 PROCEDURE — 93010 ELECTROCARDIOGRAM REPORT: CPT | Performed by: INTERNAL MEDICINE

## 2024-08-26 PROCEDURE — 36415 COLL VENOUS BLD VENIPUNCTURE: CPT

## 2024-08-26 RX ORDER — MAGNESIUM HYDROXIDE/ALUMINUM HYDROXICE/SIMETHICONE 120; 1200; 1200 MG/30ML; MG/30ML; MG/30ML
30 SUSPENSION ORAL
Status: COMPLETED | OUTPATIENT
Start: 2024-08-26 | End: 2024-08-26

## 2024-08-26 RX ORDER — PANTOPRAZOLE SODIUM 40 MG/1
40 TABLET, DELAYED RELEASE ORAL DAILY
Qty: 30 TABLET | Refills: 0 | Status: SHIPPED | OUTPATIENT
Start: 2024-08-26 | End: 2024-09-25

## 2024-08-26 RX ORDER — SUCRALFATE 1 G/1
1 TABLET ORAL 3 TIMES DAILY
Qty: 90 TABLET | Refills: 0 | Status: SHIPPED | OUTPATIENT
Start: 2024-08-26

## 2024-08-26 RX ADMIN — ALUMINUM HYDROXIDE, MAGNESIUM HYDROXIDE, AND SIMETHICONE 30 ML: 1200; 120; 1200 SUSPENSION ORAL at 01:22

## 2024-08-26 ASSESSMENT — PAIN SCALES - GENERAL
PAINLEVEL_OUTOF10: 7
PAINLEVEL_OUTOF10: 7

## 2024-08-26 ASSESSMENT — PAIN DESCRIPTION - PAIN TYPE: TYPE: ACUTE PAIN

## 2024-08-26 ASSESSMENT — PAIN - FUNCTIONAL ASSESSMENT: PAIN_FUNCTIONAL_ASSESSMENT: 0-10

## 2024-08-26 NOTE — ED PROVIDER NOTES
[facility] EMERGENCY DEPT  EMERGENCY DEPARTMENT ENCOUNTER      Pt Name: Jess Saunders  MRN: [id number]  Birthdate 2000  Date of evaluation: 8/26/2024  Provider: Donal Reid DO    CHIEF COMPLAINT       Chief Complaint   Patient presents with    Chest Pain         HISTORY OF PRESENT ILLNESS   (Location/Symptom, Timing/Onset, Context/Setting, Quality, Duration, Modifying Factors, Severity)  Note limiting factors.   24-year-old female comes in for what she describes as epigastric and chest discomfort.  She ate dinner around 8:00.  About 9:00 the pain started.  Midepigastric discomfort that radiates up into her chest.  She states that it feels like a burning discomfort that seems to come and go.  Patient describes what sounds like a recent diagnosis of H. pylori for which she was treated.  She was seeing GI for this.  She denies lower abdominal pain.  In chart review, patient was seen several days ago and week or so before that for similar symptoms.            Review of External Medical Records:     Nursing Notes were reviewed.    REVIEW OF SYSTEMS    (2-9 systems for level 4, 10 or more for level 5)     Review of Systems    Except as noted above the remainder of the review of systems was reviewed and negative.       PAST MEDICAL HISTORY     Past Medical History:   Diagnosis Date    Orthostatic hypotension          SURGICAL HISTORY     History reviewed. No pertinent surgical history.      CURRENT MEDICATIONS       Current Discharge Medication List        CONTINUE these medications which have NOT CHANGED    Details   fludrocortisone (FLORINEF) 0.1 MG tablet Take 1 tablet by mouth 2 times daily  Qty: 60 tablet, Refills: 1      propranolol (INDERAL LA) 60 MG extended release capsule Take 1 capsule by mouth daily  Qty: 90 capsule, Refills: 0    Associated Diagnoses: Acute intractable headache, unspecified headache type             ALLERGIES     Macadamia nut oil and Peanut-containing drug  (Midepigastric discomfort.  Negative Blair's, negative McBurney sign.  No other lower abdominal pain).   Musculoskeletal:         General: Normal range of motion.   Skin:     Coloration: Skin is not jaundiced.   Neurological:      Mental Status: She is alert and oriented to person, place, and time.   Psychiatric:         Mood and Affect: Mood normal.         DIAGNOSTIC RESULTS     EKG: All EKG's are interpreted by the Emergency Department Physician who either signs or Co-signs this chart in the absence of a cardiologist.        RADIOLOGY:   Non-plain film images such as CT, Ultrasound and MRI are read by the radiologist. Plain radiographic images are visualized and preliminarily interpreted by the emergency physician with the below findings:        Interpretation per the Radiologist below, if available at the time of this note:    XR CHEST (2 VW)   Final Result   No acute process.          Electronically signed by Vicente Ramirez           LABS:  Labs Reviewed   CBC WITH AUTO DIFFERENTIAL - Abnormal; Notable for the following components:       Result Value    Eosinophils % 2 (*)     All other components within normal limits   COMPREHENSIVE METABOLIC PANEL - Abnormal; Notable for the following components:    Glucose 107 (*)     BUN/Creatinine Ratio 27 (*)     All other components within normal limits   URINALYSIS WITH MICROSCOPIC - Abnormal; Notable for the following components:    Blood, Urine TRACE (*)     All other components within normal limits   LIPASE   POC PREGNANCY UR-QUAL   POC PREGNANCY UR-QUAL       All other labs were within normal range or not returned as of this dictation.    EMERGENCY DEPARTMENT COURSE and DIFFERENTIAL DIAGNOSIS/MDM:   Vitals:    Vitals:    08/26/24 0105   BP: (!) 104/48   Pulse: 74   Resp: 16   Temp: 98.5 °F (36.9 °C)   TempSrc: Oral   SpO2: 97%   Weight: 51.7 kg (114 lb)   Height: 1.524 m (5')           Medical Decision Making  Given the large differential diagnosis for .name, the

## 2024-08-26 NOTE — ED TRIAGE NOTES
Pt arrives via EMS c/o epigastric pain and chest pain that radiates down the left arm as well as a burning sensation that started around 2100 last night. Pt denies n/v. Pt denies past medical history other than hypotension. Pt reports being diagnosed with a bacterial infection in her stomach earlier this month.

## 2024-09-09 ENCOUNTER — TELEPHONE (OUTPATIENT)
Age: 24
End: 2024-09-09

## 2024-10-01 ENCOUNTER — TELEPHONE (OUTPATIENT)
Age: 24
End: 2024-10-01

## 2024-10-01 ENCOUNTER — OFFICE VISIT (OUTPATIENT)
Age: 24
End: 2024-10-01

## 2024-10-01 VITALS
SYSTOLIC BLOOD PRESSURE: 93 MMHG | HEIGHT: 60 IN | OXYGEN SATURATION: 99 % | RESPIRATION RATE: 17 BRPM | TEMPERATURE: 97.7 F | BODY MASS INDEX: 21.79 KG/M2 | HEART RATE: 58 BPM | WEIGHT: 111 LBS | DIASTOLIC BLOOD PRESSURE: 47 MMHG

## 2024-10-01 DIAGNOSIS — K80.20 CALCULUS OF GALLBLADDER WITHOUT CHOLECYSTITIS WITHOUT OBSTRUCTION: ICD-10-CM

## 2024-10-01 DIAGNOSIS — R10.13 EPIGASTRIC PAIN: Primary | ICD-10-CM

## 2024-10-01 LAB
HCG, PREGNANCY, URINE, POC: NEGATIVE
VALID INTERNAL CONTROL, POC: YES

## 2024-10-01 PROCEDURE — 81025 URINE PREGNANCY TEST: CPT

## 2024-10-01 PROCEDURE — 99213 OFFICE O/P EST LOW 20 MIN: CPT

## 2024-10-01 RX ORDER — PANTOPRAZOLE SODIUM 40 MG/1
40 TABLET, DELAYED RELEASE ORAL 2 TIMES DAILY
Qty: 90 TABLET | Refills: 1 | Status: SHIPPED | OUTPATIENT
Start: 2024-10-01

## 2024-10-01 ASSESSMENT — PATIENT HEALTH QUESTIONNAIRE - PHQ9
SUM OF ALL RESPONSES TO PHQ9 QUESTIONS 1 & 2: 1
1. LITTLE INTEREST OR PLEASURE IN DOING THINGS: NOT AT ALL
SUM OF ALL RESPONSES TO PHQ QUESTIONS 1-9: 1
2. FEELING DOWN, DEPRESSED OR HOPELESS: SEVERAL DAYS
SUM OF ALL RESPONSES TO PHQ QUESTIONS 1-9: 1

## 2024-10-01 NOTE — TELEPHONE ENCOUNTER
Patient walk into the doctor office she had  appointment at another place we schedule her here do to c/o burning in the stomach and chest pain.

## 2024-10-01 NOTE — PATIENT INSTRUCTIONS
- complete the stool sample and return it to the clinic  - take the newly prescribed medicine twice daily  - call the provided number to schedule with the general surgeon

## 2024-10-03 NOTE — PROGRESS NOTES
I reviewed with the resident the medical history and the resident's findings on the physical examination.  I discussed with the resident the patient's diagnosis and concur with the plan.   
Roomed by name and .    [facility] # [id number]  Int # [id number] (Matty)    Chief Complaint   Patient presents with    Abdominal Pain    Chest Pain        Vitals:    10/01/24 1737   BP: (!) 93/47   Pulse: 58   Resp: 17   Temp: 97.7 °F (36.5 °C)   TempSrc: Temporal   SpO2: 99%   Weight: 50.3 kg (111 lb)   Height: 1.524 m (5')          \"Have you been to the ER, urgent care clinic since your last visit?  Hospitalized since your last visit?\"    Yes.  [facility].    “Have you seen or consulted any other health care providers outside of [facility] since your last visit?”    NO            Click Here for Release of Records Request     
patient as well. Informed patient to return to the office if new symptoms arise.        Bereket Buchanan MD  Patient discussed with attending Dr. Collins

## 2024-10-07 LAB
H PYLORI AG STL QL IA: NEGATIVE
SPECIMEN SOURCE: NORMAL

## 2024-10-08 ENCOUNTER — TELEPHONE (OUTPATIENT)
Age: 24
End: 2024-10-08

## 2024-10-08 NOTE — TELEPHONE ENCOUNTER
----- Message from Savannah MCCOY sent at 10/8/2024  9:15 AM EDT -----  Regarding: ECC Results Request  ECC Results Request    Which lab or imaging result is the patient calling about: Stool Test and Urinary Test.    Which provider ordered the test? Bereket Buchanan MD    Was this a Non-[facility] Provider: No    Date the test was preformed: 10/1/2024  --------------------------------------------------------------------------------------------------------------------------    Relationship to Patient: Self     Call Back Info: OK to leave message on voicemail: Jamaican Language.  Preferred Call Back Number: Phone: [phone]

## 2024-10-08 NOTE — TELEPHONE ENCOUNTER
Called patient to discuss results.  used. No answer. Brief HIPAA compliant message left. Will also send patient letter.    Repeat H. Pylori testing negative.     Bereket Buchanan MD

## 2024-10-28 NOTE — PROGRESS NOTES
[facility]   [address] [place] [address]   Office [phone], Fax [phone]      Chief Complaint:   No chief complaint on file.          Subjective:   HPI:  Jess Saunders is a 24 y.o. female that presents for:    Rpt Hpylori test neg    1. Epigastric pain: Favor reflux as underlying etiology. History of prior H. Pylor s/p triple therapy without test of cure. Will test today. Protonix 40 BID in the interim for symptomatic relief. Continue sucralfate. No red flag symptoms. Can consider GI referral or additional imaging if no improvement  - AMB POC URINE PREGNANCY TEST, VISUAL COLOR COMPARISON - reviewed and negative    - pantoprazole (PROTONIX) 40 MG tablet; Take 1 tablet by mouth in the morning and at bedtime  Dispense: 90 tablet; Refill: 1  - H. Pylori Antigen, Stool; Future     2. Calculus of gallbladder without cholecystitis without obstruction: Incidentally seen on recent US.  - Ricardo Stubbs MD, General Surgery, [facility]    Please note that this dictation was completed with [employer] the [employer] voice recognition software.  Quite often unanticipated grammatical, syntax, homophones, and other interpretive errors are inadvertently transcribed by the computer software.  Please disregard these errors.  Please excuse any errors that have escaped final proofreading.     Past medical history, social history, medications, and allergies personally reviewed.      Medications:   Current Outpatient Medications   Medication Sig    pantoprazole (PROTONIX) 40 MG tablet Take 1 tablet by mouth in the morning and at bedtime    sucralfate (CARAFATE) 1 GM tablet Take 1 tablet by mouth in the morning, at noon, and at bedtime    fludrocortisone (FLORINEF) 0.1 MG tablet Take 1 tablet by mouth 2 times daily    propranolol (INDERAL LA) 60 MG extended release capsule Take 1 capsule by mouth daily     No current

## 2024-10-29 ENCOUNTER — OFFICE VISIT (OUTPATIENT)
Age: 24
End: 2024-10-29

## 2024-10-29 VITALS
RESPIRATION RATE: 18 BRPM | BODY MASS INDEX: 21.6 KG/M2 | HEART RATE: 67 BPM | OXYGEN SATURATION: 98 % | HEIGHT: 60 IN | WEIGHT: 110 LBS | SYSTOLIC BLOOD PRESSURE: 89 MMHG | DIASTOLIC BLOOD PRESSURE: 48 MMHG | TEMPERATURE: 98.3 F

## 2024-10-29 DIAGNOSIS — R10.13 EPIGASTRIC PAIN: ICD-10-CM

## 2024-10-29 DIAGNOSIS — K59.04 CHRONIC IDIOPATHIC CONSTIPATION: Primary | ICD-10-CM

## 2024-10-29 DIAGNOSIS — R51.9 ACUTE INTRACTABLE HEADACHE, UNSPECIFIED HEADACHE TYPE: ICD-10-CM

## 2024-10-29 DIAGNOSIS — R10.13 DYSPEPSIA: ICD-10-CM

## 2024-10-29 PROCEDURE — 99213 OFFICE O/P EST LOW 20 MIN: CPT

## 2024-10-29 RX ORDER — PANTOPRAZOLE SODIUM 40 MG/1
40 TABLET, DELAYED RELEASE ORAL 2 TIMES DAILY
Qty: 90 TABLET | Refills: 1 | Status: SHIPPED | OUTPATIENT
Start: 2024-10-29

## 2024-10-29 RX ORDER — POLYETHYLENE GLYCOL 3350 17 G/17G
17 POWDER, FOR SOLUTION ORAL DAILY
Qty: 10 EACH | Refills: 1 | Status: SHIPPED | OUTPATIENT
Start: 2024-10-29 | End: 2024-11-28

## 2024-10-29 RX ORDER — PROPRANOLOL HYDROCHLORIDE 60 MG/1
60 CAPSULE, EXTENDED RELEASE ORAL DAILY
Qty: 90 CAPSULE | Refills: 1 | Status: SHIPPED | OUTPATIENT
Start: 2024-10-29 | End: 2025-04-27

## 2024-10-29 NOTE — PROGRESS NOTES
[facility]   [address] [place] [address]   Office [phone], Fax [phone]      Chief Complaint:     Chief Complaint   Patient presents with    Abdominal Pain     Patient is coming in for an abdominal pain. She states she gets a sharp stabbing pain on her left upper quadrant when she eats. That radiates into her left side of back. No other concerns.      Subjective:   HPI:  Jess Saunders is a 24 y.o. female that presents for:    Dyspepsia: resolved with pantoprazole and sucralfate.   No red flag symptoms.Repeat H pylori test is negative.     Calculus of gallbladder without cholecystitis without obstruction: Incidentally seen on recent US.  Has made appt on Gen surgery tomorrow 10/30/24    Left lower quadrant pain/cramps  Worsens when she eats food. Has bowel movement once a day, very hard stools. Has to strain to pass stools. Has pain while defecation d/t straining. Drinks 1- 2 cups of water maybe, forgets to drink water sometimes.   Rarely eats vegetable or fruits.  No previous abdominal surgeries.   CT abd/ep    Please note that this dictation was completed with "[employer]", the computer voice recognition software.  Quite often unanticipated grammatical, syntax, homophones, and other interpretive errors are inadvertently transcribed by the computer software.  Please disregard these errors.  Please excuse any errors that have escaped final proofreading.     Past medical history, social history, medications, and allergies personally reviewed.      Medications:   Current Outpatient Medications   Medication Sig    pantoprazole (PROTONIX) 40 MG tablet Take 1 tablet by mouth in the morning and at bedtime    fludrocortisone (FLORINEF) 0.1 MG tablet Take 1 tablet by mouth 2 times daily    sucralfate (CARAFATE) 1 GM tablet Take 1 tablet by mouth in the morning, at noon, and at bedtime (Patient not taking: Reported on

## 2024-10-29 NOTE — PROGRESS NOTES
Jess Saunders is a 24 y.o. female      Chief Complaint   Patient presents with    Abdominal Pain     Patient is coming in for an abdominal pain. She states she gets a sharp stabbing pain on her left upper quadrant when she eats. That radiates into her left side of back. No other concerns.        \"Have you been to the ER, urgent care clinic since your last visit?  Hospitalized since your last visit?\"    NO    “Have you seen or consulted any other health care providers outside of [facility] since your last visit?”    NO              Vitals:    10/29/24 1434   BP: (!) 89/48   Site: Right Upper Arm   Position: Sitting   Pulse: 67   Resp: 18   Temp: 98.3 °F (36.8 °C)   TempSrc: Axillary   SpO2: 98%   Weight: 49.9 kg (110 lb)   Height: 1.524 m (5')            Health Maintenance Due   Topic Date Due    Varicella vaccine (1 of 2 - 13+ 2-dose series) Never done    HPV vaccine (1 - 3-dose series) Never done    Hepatitis B vaccine (1 of 3 - 19+ 3-dose series) Never done    Flu vaccine (1) Never done    COVID-19 Vaccine (1 - 2023-24 season) Never done         Medication Reconciliation completed, changes noted.  Please  Update medication list.

## 2024-11-07 ENCOUNTER — APPOINTMENT (OUTPATIENT)
Facility: HOSPITAL | Age: 24
End: 2024-11-07

## 2024-11-07 ENCOUNTER — HOSPITAL ENCOUNTER (EMERGENCY)
Facility: HOSPITAL | Age: 24
Discharge: HOME | End: 2024-11-07
Attending: EMERGENCY MEDICINE

## 2024-11-07 VITALS
HEIGHT: 61 IN | DIASTOLIC BLOOD PRESSURE: 63 MMHG | OXYGEN SATURATION: 98 % | WEIGHT: 110.23 LBS | SYSTOLIC BLOOD PRESSURE: 98 MMHG | RESPIRATION RATE: 18 BRPM | BODY MASS INDEX: 20.81 KG/M2 | HEART RATE: 67 BPM | TEMPERATURE: 98.4 F

## 2024-11-07 DIAGNOSIS — R07.9 CHEST PAIN, UNSPECIFIED TYPE: Primary | ICD-10-CM

## 2024-11-07 DIAGNOSIS — R10.84 GENERALIZED ABDOMINAL PAIN: ICD-10-CM

## 2024-11-07 LAB
ALBUMIN SERPL-MCNC: 3.8 G/DL (ref 3.5–5)
ALBUMIN/GLOB SERPL: 1 (ref 1.1–2.2)
ALP SERPL-CCNC: 121 U/L (ref 45–117)
ALT SERPL-CCNC: 20 U/L (ref 12–78)
ANION GAP SERPL CALC-SCNC: 4 MMOL/L (ref 2–12)
APPEARANCE UR: CLEAR
AST SERPL-CCNC: 10 U/L (ref 15–37)
BACTERIA URNS QL MICRO: NEGATIVE /HPF
BASOPHILS # BLD: 0 K/UL (ref 0–0.1)
BASOPHILS NFR BLD: 1 % (ref 0–1)
BILIRUB SERPL-MCNC: 0.3 MG/DL (ref 0.2–1)
BILIRUB UR QL: NEGATIVE
BUN SERPL-MCNC: 8 MG/DL (ref 6–20)
BUN/CREAT SERPL: 12 (ref 12–20)
CALCIUM SERPL-MCNC: 9.2 MG/DL (ref 8.5–10.1)
CHLORIDE SERPL-SCNC: 110 MMOL/L (ref 97–108)
CO2 SERPL-SCNC: 26 MMOL/L (ref 21–32)
COLOR UR: ABNORMAL
COMMENT:: NORMAL
CREAT SERPL-MCNC: 0.65 MG/DL (ref 0.55–1.02)
DIFFERENTIAL METHOD BLD: NORMAL
EKG ATRIAL RATE: 72 BPM
EKG DIAGNOSIS: NORMAL
EKG P AXIS: 52 DEGREES
EKG P-R INTERVAL: 138 MS
EKG Q-T INTERVAL: 354 MS
EKG QRS DURATION: 86 MS
EKG QTC CALCULATION (BAZETT): 387 MS
EKG R AXIS: 90 DEGREES
EKG T AXIS: 70 DEGREES
EKG VENTRICULAR RATE: 72 BPM
EOSINOPHIL # BLD: 0.3 K/UL (ref 0–0.4)
EOSINOPHIL NFR BLD: 4 % (ref 0–7)
EPITH CASTS URNS QL MICRO: ABNORMAL /LPF
ERYTHROCYTE [DISTWIDTH] IN BLOOD BY AUTOMATED COUNT: 12.5 % (ref 11.5–14.5)
GLOBULIN SER CALC-MCNC: 3.7 G/DL (ref 2–4)
GLUCOSE SERPL-MCNC: 138 MG/DL (ref 65–100)
GLUCOSE UR STRIP.AUTO-MCNC: NEGATIVE MG/DL
HCG UR QL: NEGATIVE
HCT VFR BLD AUTO: 40.8 % (ref 35–47)
HGB BLD-MCNC: 13.6 G/DL (ref 11.5–16)
HGB UR QL STRIP: ABNORMAL
HYALINE CASTS URNS QL MICRO: ABNORMAL /LPF (ref 0–5)
IMM GRANULOCYTES # BLD AUTO: 0 K/UL (ref 0–0.04)
IMM GRANULOCYTES NFR BLD AUTO: 0 % (ref 0–0.5)
KETONES UR QL STRIP.AUTO: NEGATIVE MG/DL
LEUKOCYTE ESTERASE UR QL STRIP.AUTO: NEGATIVE
LIPASE SERPL-CCNC: 43 U/L (ref 13–75)
LYMPHOCYTES # BLD: 2.8 K/UL (ref 0.8–3.5)
LYMPHOCYTES NFR BLD: 39 % (ref 12–49)
MCH RBC QN AUTO: 28 PG (ref 26–34)
MCHC RBC AUTO-ENTMCNC: 33.3 G/DL (ref 30–36.5)
MCV RBC AUTO: 84.1 FL (ref 80–99)
MONOCYTES # BLD: 0.4 K/UL (ref 0–1)
MONOCYTES NFR BLD: 6 % (ref 5–13)
NEUTS SEG # BLD: 3.5 K/UL (ref 1.8–8)
NEUTS SEG NFR BLD: 50 % (ref 32–75)
NITRITE UR QL STRIP.AUTO: NEGATIVE
NRBC # BLD: 0 K/UL (ref 0–0.01)
NRBC BLD-RTO: 0 PER 100 WBC
PH UR STRIP: 5.5 (ref 5–8)
PLATELET # BLD AUTO: 271 K/UL (ref 150–400)
PMV BLD AUTO: 9.9 FL (ref 8.9–12.9)
POTASSIUM SERPL-SCNC: 3.7 MMOL/L (ref 3.5–5.1)
PROT SERPL-MCNC: 7.5 G/DL (ref 6.4–8.2)
PROT UR STRIP-MCNC: NEGATIVE MG/DL
RBC # BLD AUTO: 4.85 M/UL (ref 3.8–5.2)
RBC #/AREA URNS HPF: ABNORMAL /HPF (ref 0–5)
SODIUM SERPL-SCNC: 140 MMOL/L (ref 136–145)
SP GR UR REFRACTOMETRY: 1.01 (ref 1–1.03)
SPECIMEN HOLD: NORMAL
SPECIMEN HOLD: NORMAL
TROPONIN I SERPL HS-MCNC: 4 NG/L (ref 0–51)
UROBILINOGEN UR QL STRIP.AUTO: 0.2 EU/DL (ref 0.2–1)
WBC # BLD AUTO: 7.1 K/UL (ref 3.6–11)
WBC URNS QL MICRO: ABNORMAL /HPF (ref 0–4)

## 2024-11-07 PROCEDURE — 6360000002 HC RX W HCPCS: Performed by: EMERGENCY MEDICINE

## 2024-11-07 PROCEDURE — 2580000003 HC RX 258: Performed by: EMERGENCY MEDICINE

## 2024-11-07 PROCEDURE — 74019 RADEX ABDOMEN 2 VIEWS: CPT

## 2024-11-07 PROCEDURE — 96375 TX/PRO/DX INJ NEW DRUG ADDON: CPT

## 2024-11-07 PROCEDURE — 80053 COMPREHEN METABOLIC PANEL: CPT

## 2024-11-07 PROCEDURE — 93005 ELECTROCARDIOGRAM TRACING: CPT | Performed by: EMERGENCY MEDICINE

## 2024-11-07 PROCEDURE — 83690 ASSAY OF LIPASE: CPT

## 2024-11-07 PROCEDURE — 81025 URINE PREGNANCY TEST: CPT

## 2024-11-07 PROCEDURE — 85025 COMPLETE CBC W/AUTO DIFF WBC: CPT

## 2024-11-07 PROCEDURE — 96374 THER/PROPH/DIAG INJ IV PUSH: CPT

## 2024-11-07 PROCEDURE — 99285 EMERGENCY DEPT VISIT HI MDM: CPT

## 2024-11-07 PROCEDURE — 81001 URINALYSIS AUTO W/SCOPE: CPT

## 2024-11-07 PROCEDURE — 71046 X-RAY EXAM CHEST 2 VIEWS: CPT

## 2024-11-07 PROCEDURE — 36415 COLL VENOUS BLD VENIPUNCTURE: CPT

## 2024-11-07 PROCEDURE — 84484 ASSAY OF TROPONIN QUANT: CPT

## 2024-11-07 RX ORDER — KETOROLAC TROMETHAMINE 10 MG/1
10 TABLET, FILM COATED ORAL 3 TIMES DAILY PRN
Qty: 16 TABLET | Refills: 0 | Status: SHIPPED | OUTPATIENT
Start: 2024-11-07

## 2024-11-07 RX ORDER — KETOROLAC TROMETHAMINE 30 MG/ML
15 INJECTION, SOLUTION INTRAMUSCULAR; INTRAVENOUS
Status: COMPLETED | OUTPATIENT
Start: 2024-11-07 | End: 2024-11-07

## 2024-11-07 RX ORDER — ONDANSETRON 2 MG/ML
4 INJECTION INTRAMUSCULAR; INTRAVENOUS
Status: COMPLETED | OUTPATIENT
Start: 2024-11-07 | End: 2024-11-07

## 2024-11-07 RX ORDER — FAMOTIDINE 20 MG/1
20 TABLET, FILM COATED ORAL 2 TIMES DAILY
Qty: 60 TABLET | Refills: 0 | Status: SHIPPED | OUTPATIENT
Start: 2024-11-07

## 2024-11-07 RX ADMIN — ONDANSETRON 4 MG: 2 INJECTION INTRAMUSCULAR; INTRAVENOUS at 20:17

## 2024-11-07 RX ADMIN — KETOROLAC TROMETHAMINE 15 MG: 30 INJECTION, SOLUTION INTRAMUSCULAR at 20:17

## 2024-11-07 RX ADMIN — PANTOPRAZOLE SODIUM 40 MG: 40 INJECTION, POWDER, FOR SOLUTION INTRAVENOUS at 20:15

## 2024-11-07 ASSESSMENT — PAIN DESCRIPTION - LOCATION: LOCATION: ABDOMEN;CHEST

## 2024-11-07 ASSESSMENT — ENCOUNTER SYMPTOMS
NAUSEA: 0
BACK PAIN: 0
DIARRHEA: 0
COUGH: 0
CONSTIPATION: 0
SHORTNESS OF BREATH: 0
TROUBLE SWALLOWING: 0
ABDOMINAL PAIN: 0

## 2024-11-07 ASSESSMENT — PAIN DESCRIPTION - DESCRIPTORS: DESCRIPTORS: ACHING

## 2024-11-07 ASSESSMENT — PAIN - FUNCTIONAL ASSESSMENT: PAIN_FUNCTIONAL_ASSESSMENT: 0-10

## 2024-11-07 ASSESSMENT — PAIN SCALES - GENERAL
PAINLEVEL_OUTOF10: 0
PAINLEVEL_OUTOF10: 3

## 2024-11-07 ASSESSMENT — PAIN SCALES - WONG BAKER: WONGBAKER_NUMERICALRESPONSE: NO HURT

## 2024-11-07 NOTE — ED TRIAGE NOTES
Pt ambualtory to triage from home w/ c/o left side CP and abd pain since 1500 today.  Denies n/v/d- states she ate tamales and the pain became worse.
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
## 2024-11-08 NOTE — ED PROVIDER NOTES
[facility] EMERGENCY DEP  EMERGENCY DEPARTMENT ENCOUNTER      Pt Name: Jess Saunders  MRN: [id number]  Birthdate 2000  Date of evaluation: 11/7/2024  Provider: JESS Schmitz NP    CHIEF COMPLAINT       Chief Complaint   Patient presents with    Chest Pain    Shortness of Breath    Abdominal Pain         HISTORY OF PRESENT ILLNESS   (Location/Symptom, Timing/Onset, Context/Setting, Quality, Duration, Modifying Factors, Severity)  Note limiting factors.   HPI  Patient is a 24-year-old  female who presents to the ED with left-sided chest pain that radiates into her left arm and abdomen.  She states the pain started around 3 PM today.  She was evaluated October 30 at [facility] for concerns about gallstones but refused any surgery at that time.  She states the pain does not feel the same.Denies fever, cold symptoms, headache, neck pain, visual changes, focal weakness or rash. Denies any difficulty breathing, difficulty swallowing, SOB or chest pain. Denies any nausea, vomiting or diarrhea. Pt. Reports she has not had any pain medications today prior to arrival.  Language line employed        Review of External Medical Records:     Nursing Notes were reviewed.    REVIEW OF SYSTEMS    (2-9 systems for level 4, 10 or more for level 5)     Review of Systems   Constitutional:  Negative for activity change, appetite change, fever and unexpected weight change.   HENT:  Negative for congestion and trouble swallowing.    Eyes:  Negative for visual disturbance.   Respiratory:  Negative for cough and shortness of breath.    Cardiovascular:  Positive for chest pain. Negative for palpitations and leg swelling.   Gastrointestinal:  Negative for abdominal pain, constipation, diarrhea and nausea.   Genitourinary:  Negative for dysuria and flank pain.   Musculoskeletal:  Negative for back pain.   Skin:  Negative for rash.   Neurological:  Negative for headaches.   All other systems reviewed and are

## 2024-11-21 ENCOUNTER — OFFICE VISIT (OUTPATIENT)
Age: 24
End: 2024-11-21

## 2024-11-21 VITALS
BODY MASS INDEX: 21.41 KG/M2 | TEMPERATURE: 98.5 F | DIASTOLIC BLOOD PRESSURE: 50 MMHG | HEART RATE: 72 BPM | SYSTOLIC BLOOD PRESSURE: 83 MMHG | OXYGEN SATURATION: 97 % | WEIGHT: 113.4 LBS | HEIGHT: 61 IN

## 2024-11-21 DIAGNOSIS — R10.13 DYSPEPSIA: Primary | ICD-10-CM

## 2024-11-21 DIAGNOSIS — K80.20 CALCULUS OF GALLBLADDER WITHOUT CHOLECYSTITIS WITHOUT OBSTRUCTION: ICD-10-CM

## 2024-11-21 PROCEDURE — 99214 OFFICE O/P EST MOD 30 MIN: CPT

## 2024-11-21 RX ORDER — OMEPRAZOLE 40 MG/1
40 CAPSULE, DELAYED RELEASE ORAL
Qty: 90 CAPSULE | Refills: 1 | Status: SHIPPED | OUTPATIENT
Start: 2024-11-21

## 2024-11-21 RX ORDER — FAMOTIDINE 20 MG/1
20 TABLET, FILM COATED ORAL 2 TIMES DAILY
Qty: 60 TABLET | Refills: 3 | Status: SHIPPED | OUTPATIENT
Start: 2024-11-21

## 2024-11-23 NOTE — PROGRESS NOTES
I reviewed the patient's medical history, the resident's findings on physical examination, the patient's diagnoses, and treatment plan as documented in the resident note. I saw the patient with the resident reviewing history and physical findings in person and participating in assessment and patient instruction.   I concur with the treatment plan as documented.   
Jess Saunders is a 24 y.o. female      Chief Complaint   Patient presents with    Abdominal Pain     Palpitations upper mid and right abdomen, surgery for \"stones\" coming up, ER 11/7/24 for chest pain and abdominal pain.       \"Have you been to the ER, urgent care clinic since your last visit?  Hospitalized since your last visit?\"    YES - When: approximately 2  weeks ago.  Where and Why: [facility] for chest pain and abdominal pain.    “Have you seen or consulted any other health care providers outside of [facility] since your last visit?”    NO            Click Here for Release of Records Request    Vitals:    11/21/24 1541   BP: (!) 83/50   Pulse: 72   Temp: 98.5 °F (36.9 °C)   TempSrc: Oral   SpO2: 97%   Weight: 51.4 kg (113 lb 6.4 oz)   Height: 1.549 m (5' 0.98\")           Medication Reconciliation Completed, changes notes. Please Update medication list.  
in acute distress.     Appearance: Normal appearance.   Cardiovascular:      Rate and Rhythm: Normal rate and regular rhythm.      Pulses: Normal pulses.      Heart sounds: Normal heart sounds. No murmur heard.  Pulmonary:      Effort: Pulmonary effort is normal.      Breath sounds: Normal breath sounds.   Abdominal:      General: Abdomen is flat. Bowel sounds are normal. There is no distension.      Palpations: Abdomen is soft. There is no mass.      Tenderness: There is no guarding or rebound.      Hernia: No hernia is present.      Comments: Generalized abdominal tenderness worst in epigastrium, no peritoneal signs   Neurological:      Mental Status: She is alert.        Labs  Lab Results   Component Value Date/Time     11/07/2024 06:18 PM    K 3.7 11/07/2024 06:18 PM     11/07/2024 06:18 PM    CO2 26 11/07/2024 06:18 PM    BUN 8 11/07/2024 06:18 PM    GFRAA >60 03/24/2022 10:20 AM        Lab Results   Component Value Date/Time    WBC 7.1 11/07/2024 06:18 PM    HGBPOC 14.2 02/21/2022 11:44 AM    HGB 13.6 11/07/2024 06:18 PM    HCT 40.8 11/07/2024 06:18 PM     11/07/2024 06:18 PM    MCV 84.1 11/07/2024 06:18 PM        No results found for: \"HBA1C\"     No results found for: \"CHOL\", \"CHOLPOCT\", \"CHLST\", \"CHOLV\", \"TOTCHOLEXT\", \"HDL\", \"HDLPOC\", \"HDLEXT\", \"HDLC\", \"LDL\", \"LDLCEXT\", \"LDLC\", \"VLDLC\", \"VLDL\", \"TRIGLYCEXT\"     Assessment and Plan   Jess Saunders is a 24 y.o. female who presents for abdominal pain.    1. Dyspepsia: Chronic with acute exacerbation. Previously positive for H pylori but treated. Has not seen GI. Has been taking pantoprazole but prefers omeprazole so will change to that, may also use pepcid. Should be seen for GI for potential endoscopy given refractory symptoms.  -     LETTY - Enzo Goel MD, Gastroenterology, [place]  -     omeprazole (PRILOSEC) 40 MG delayed release capsule; Take 1 capsule by mouth every morning (before breakfast), Disp-90 capsule,

## 2025-01-09 ENCOUNTER — TELEPHONE (OUTPATIENT)
Age: 25
End: 2025-01-09

## 2025-01-09 RX ORDER — FLUDROCORTISONE ACETATE 0.1 MG/1
0.1 TABLET ORAL 2 TIMES DAILY
Qty: 60 TABLET | Refills: 1 | Status: SHIPPED | OUTPATIENT
Start: 2025-01-09

## 2025-01-09 NOTE — TELEPHONE ENCOUNTER
Patient is calling because she needs a refill on florinef 0.1 mg.Patient also is having pain in her left arm and left side of  her chest.Patient says when she press on the area she is in pain and heavy feeling.    Patient has been experiencing this for about 3 days ago.    Patient said she stop taking the medicine 2 weeks ago because she ran out of medicine.Pharmacy is confirmed [facility] on [facility] [address] [facility].    [phone] patient

## 2025-01-17 ENCOUNTER — TELEPHONE (OUTPATIENT)
Age: 25
End: 2025-01-17

## 2025-01-17 NOTE — TELEPHONE ENCOUNTER
PT continuing to have stomach pains when she eats and drinks. She let sucralfate (Carafate) 1 g tablet run out. This was a medication prescribed after her 9/13 ED visit. Would like Dr. Cardenas to send in new script to [address] -  [phone] -  [phone]  Pt has an appointment here on 1/21.

## 2025-01-18 DIAGNOSIS — R10.13 DYSPEPSIA: Primary | ICD-10-CM

## 2025-01-18 RX ORDER — SUCRALFATE 1 G/1
1 TABLET ORAL 4 TIMES DAILY
Qty: 240 TABLET | Refills: 0 | Status: SHIPPED | OUTPATIENT
Start: 2025-01-18 | End: 2025-03-19

## 2025-01-20 ENCOUNTER — TELEPHONE (OUTPATIENT)
Age: 25
End: 2025-01-20

## 2025-01-20 NOTE — TELEPHONE ENCOUNTER
Pt has been contacted to reschedule appointment with Dr. Cardenas, due to Dr. Cardenas's absence on 1/21.

## 2025-01-21 NOTE — PROGRESS NOTES
[facility] Medicine Residency    Subjective   Jess Saunders is a 24 y.o. female who presents for Dysuria (X 1 month, with frequency an blood in urine)    #Dysuria  - pain with urination and one day of blood tinged urine since 12/24/24; not having blood currently  - also having pain in bilateral flanks and chills  - has been taking medication from [place] which seems to help, just to help with sx, not an abx  - no malodorous urine or dark urine, no hx of stones  - denies n/v, fevers, diarrhea, constipation  - always have UTIs since childhood; had 3 last year  - per chart review, no positive urine cultures    Review of Systems   Review of Systems   Constitutional:  Positive for chills. Negative for fever.   Gastrointestinal:  Positive for abdominal pain.   Genitourinary:  Positive for dysuria, frequency and hematuria. Negative for vaginal bleeding and vaginal discharge.   All other systems reviewed and are negative.       Medical History  Past Medical History:   Diagnosis Date    Orthostatic hypotension        Medications  Current Outpatient Medications   Medication Sig    nitrofurantoin, macrocrystal-monohydrate, (MACROBID) 100 MG capsule Take 1 capsule by mouth 2 times daily for 5 days    sucralfate (CARAFATE) 1 GM tablet Take 1 tablet by mouth 4 times daily    fludrocortisone (FLORINEF) 0.1 MG tablet Take 1 tablet by mouth 2 times daily    omeprazole (PRILOSEC) 40 MG delayed release capsule Take 1 capsule by mouth every morning (before breakfast)    famotidine (PEPCID) 20 MG tablet Take 1 tablet by mouth 2 times daily     No current facility-administered medications for this visit.       Immunizations   Immunization History   Administered Date(s) Administered    TDaP, ADACEL (age 10y-64y), BOOSTRIX (age 10y+), IM, 0.5mL 11/30/2022       Allergies   Allergies   Allergen Reactions    Macadamia Nut Oil Rash    Peanut-Containing Drug Products

## 2025-01-22 ENCOUNTER — OFFICE VISIT (OUTPATIENT)
Age: 25
End: 2025-01-22

## 2025-01-22 VITALS
SYSTOLIC BLOOD PRESSURE: 102 MMHG | DIASTOLIC BLOOD PRESSURE: 69 MMHG | HEART RATE: 68 BPM | WEIGHT: 103.62 LBS | BODY MASS INDEX: 19.59 KG/M2 | TEMPERATURE: 97.7 F | OXYGEN SATURATION: 99 %

## 2025-01-22 DIAGNOSIS — R30.0 DYSURIA: Primary | ICD-10-CM

## 2025-01-22 LAB
BILIRUBIN, URINE, POC: NEGATIVE
BLOOD URINE, POC: NORMAL
GLUCOSE URINE, POC: NEGATIVE
KETONES, URINE, POC: NEGATIVE
LEUKOCYTE ESTERASE, URINE, POC: NEGATIVE
NITRITE, URINE, POC: NEGATIVE
PH, URINE, POC: 5.5 (ref 4.6–8)
PROTEIN,URINE, POC: NEGATIVE
SPECIFIC GRAVITY, URINE, POC: 1.03 (ref 1–1.03)
URINALYSIS CLARITY, POC: CLEAR
URINALYSIS COLOR, POC: NORMAL
UROBILINOGEN, POC: NORMAL

## 2025-01-22 PROCEDURE — 81003 URINALYSIS AUTO W/O SCOPE: CPT

## 2025-01-22 PROCEDURE — 99213 OFFICE O/P EST LOW 20 MIN: CPT

## 2025-01-22 RX ORDER — NITROFURANTOIN 25; 75 MG/1; MG/1
100 CAPSULE ORAL 2 TIMES DAILY
Qty: 10 CAPSULE | Refills: 0 | Status: SHIPPED | OUTPATIENT
Start: 2025-01-22 | End: 2025-01-27

## 2025-01-22 SDOH — ECONOMIC STABILITY: FOOD INSECURITY: WITHIN THE PAST 12 MONTHS, YOU WORRIED THAT YOUR FOOD WOULD RUN OUT BEFORE YOU GOT MONEY TO BUY MORE.: NEVER TRUE

## 2025-01-22 SDOH — ECONOMIC STABILITY: FOOD INSECURITY: WITHIN THE PAST 12 MONTHS, THE FOOD YOU BOUGHT JUST DIDN'T LAST AND YOU DIDN'T HAVE MONEY TO GET MORE.: NEVER TRUE

## 2025-01-22 ASSESSMENT — PATIENT HEALTH QUESTIONNAIRE - PHQ9
SUM OF ALL RESPONSES TO PHQ QUESTIONS 1-9: 0
SUM OF ALL RESPONSES TO PHQ QUESTIONS 1-9: 0
1. LITTLE INTEREST OR PLEASURE IN DOING THINGS: NOT AT ALL
2. FEELING DOWN, DEPRESSED OR HOPELESS: NOT AT ALL
SUM OF ALL RESPONSES TO PHQ9 QUESTIONS 1 & 2: 0
SUM OF ALL RESPONSES TO PHQ QUESTIONS 1-9: 0
SUM OF ALL RESPONSES TO PHQ QUESTIONS 1-9: 0

## 2025-01-22 ASSESSMENT — ENCOUNTER SYMPTOMS: ABDOMINAL PAIN: 1

## 2025-01-22 NOTE — PATIENT INSTRUCTIONS
Thank you for coming to your visit. It was a pleasure taking care of you!    I sent Macrobid to the pharmacy, an antibiotic, for your urinary tract infection. Please take it twice a day for 5 days total.     If you have these symptoms again, please come back to the clinic and we will need to do further testing.       Please make a follow up appointment as needed.      Please let me know if you have any questions.    Thank you,  Dr. Rowland

## 2025-01-22 NOTE — PROGRESS NOTES
Identified pt with two pt identifiers(name and ). Reviewed record in preparation for visit and have obtained necessary documentation.  Chief Complaint   Patient presents with    Dysuria     X 1 month, with frequency an blood in urine        Vitals:    25 1114   BP: 102/69   Pulse: 68   Temp: 97.7 °F (36.5 °C)   TempSrc: Oral   SpO2: 99%   Weight: 47 kg (103 lb 9.9 oz)         Coordination of Care Questionnaire:  :     \"Have you been to the ER, urgent care clinic since your last visit?  Hospitalized since your last visit?\"    NO    “Have you seen or consulted any other health care providers outside of [facility] since your last visit?”    NO            Click Here for Release of Records Request

## 2025-01-27 ENCOUNTER — TELEPHONE (OUTPATIENT)
Age: 25
End: 2025-01-27

## 2025-01-27 NOTE — TELEPHONE ENCOUNTER
----- Message from MICHAEL MCCOY RN sent at 1/27/2025  8:03 AM EST -----  Regarding: FW: ECC Escalation To Practice  Can you please help get this patient scheduled for an appointment? Thank you!  ----- Message -----  From: ArpancarlaBarrett rosssujeyse Melendez  Sent: 1/24/2025   4:43 PM EST  To: [facility] Clinical Staff  Subject: ECC Escalation To Practice                       ECC Escalation To Practice      Type of Escalation: Acute Care Symptom  --------------------------------------------------------------------------------------------------------------------------    Information for Provider:   Patient is looking for appointment for: Symptom : Stomach ache  Reasons for Message: Unable to reach practice     Additional Information : abdominal pain   --------------------------------------------------------------------------------------------------------------------------    Relationship to Patient: Self     Call Back Info: OK to leave message on voicemail  Preferred Call Back Number: [phone] ([place])